# Patient Record
Sex: FEMALE | Race: OTHER | NOT HISPANIC OR LATINO | URBAN - METROPOLITAN AREA
[De-identification: names, ages, dates, MRNs, and addresses within clinical notes are randomized per-mention and may not be internally consistent; named-entity substitution may affect disease eponyms.]

---

## 2023-05-20 ENCOUNTER — EMERGENCY (EMERGENCY)
Facility: HOSPITAL | Age: 33
LOS: 1 days | Discharge: HOME | End: 2023-05-20
Attending: STUDENT IN AN ORGANIZED HEALTH CARE EDUCATION/TRAINING PROGRAM | Admitting: EMERGENCY MEDICINE
Payer: MEDICAID

## 2023-05-20 VITALS
SYSTOLIC BLOOD PRESSURE: 126 MMHG | DIASTOLIC BLOOD PRESSURE: 83 MMHG | TEMPERATURE: 98 F | HEART RATE: 111 BPM | OXYGEN SATURATION: 100 % | RESPIRATION RATE: 18 BRPM

## 2023-05-20 LAB
ALBUMIN SERPL ELPH-MCNC: 3.6 G/DL — SIGNIFICANT CHANGE UP (ref 3.3–5)
ALP SERPL-CCNC: 180 U/L — HIGH (ref 40–120)
ALT FLD-CCNC: 12 U/L — SIGNIFICANT CHANGE UP (ref 4–33)
ANION GAP SERPL CALC-SCNC: 14 MMOL/L — SIGNIFICANT CHANGE UP (ref 7–14)
AST SERPL-CCNC: 21 U/L — SIGNIFICANT CHANGE UP (ref 4–32)
B PERT DNA SPEC QL NAA+PROBE: SIGNIFICANT CHANGE UP
B PERT+PARAPERT DNA PNL SPEC NAA+PROBE: SIGNIFICANT CHANGE UP
BASE EXCESS BLDV CALC-SCNC: -5 MMOL/L — LOW (ref -2–3)
BASOPHILS # BLD AUTO: 0.05 K/UL — SIGNIFICANT CHANGE UP (ref 0–0.2)
BASOPHILS NFR BLD AUTO: 0.4 % — SIGNIFICANT CHANGE UP (ref 0–2)
BILIRUB SERPL-MCNC: <0.2 MG/DL — SIGNIFICANT CHANGE UP (ref 0.2–1.2)
BLOOD GAS VENOUS COMPREHENSIVE RESULT: SIGNIFICANT CHANGE UP
BORDETELLA PARAPERTUSSIS (RAPRVP): SIGNIFICANT CHANGE UP
BUN SERPL-MCNC: 6 MG/DL — LOW (ref 7–23)
C PNEUM DNA SPEC QL NAA+PROBE: SIGNIFICANT CHANGE UP
CALCIUM SERPL-MCNC: 9 MG/DL — SIGNIFICANT CHANGE UP (ref 8.4–10.5)
CHLORIDE BLDV-SCNC: 101 MMOL/L — SIGNIFICANT CHANGE UP (ref 96–108)
CHLORIDE SERPL-SCNC: 101 MMOL/L — SIGNIFICANT CHANGE UP (ref 98–107)
CO2 BLDV-SCNC: 24.5 MMOL/L — SIGNIFICANT CHANGE UP (ref 22–26)
CO2 SERPL-SCNC: 19 MMOL/L — LOW (ref 22–31)
CREAT SERPL-MCNC: 0.43 MG/DL — LOW (ref 0.5–1.3)
EGFR: 133 ML/MIN/1.73M2 — SIGNIFICANT CHANGE UP
EOSINOPHIL # BLD AUTO: 0.34 K/UL — SIGNIFICANT CHANGE UP (ref 0–0.5)
EOSINOPHIL NFR BLD AUTO: 2.9 % — SIGNIFICANT CHANGE UP (ref 0–6)
FLUAV SUBTYP SPEC NAA+PROBE: SIGNIFICANT CHANGE UP
FLUBV RNA SPEC QL NAA+PROBE: SIGNIFICANT CHANGE UP
GAS PNL BLDV: 130 MMOL/L — LOW (ref 136–145)
GAS PNL BLDV: SIGNIFICANT CHANGE UP
GLUCOSE BLDV-MCNC: 125 MG/DL — HIGH (ref 70–99)
GLUCOSE SERPL-MCNC: 128 MG/DL — HIGH (ref 70–99)
HADV DNA SPEC QL NAA+PROBE: SIGNIFICANT CHANGE UP
HCO3 BLDV-SCNC: 23 MMOL/L — SIGNIFICANT CHANGE UP (ref 22–29)
HCOV 229E RNA SPEC QL NAA+PROBE: SIGNIFICANT CHANGE UP
HCOV HKU1 RNA SPEC QL NAA+PROBE: SIGNIFICANT CHANGE UP
HCOV NL63 RNA SPEC QL NAA+PROBE: SIGNIFICANT CHANGE UP
HCOV OC43 RNA SPEC QL NAA+PROBE: SIGNIFICANT CHANGE UP
HCT VFR BLD CALC: 30.5 % — LOW (ref 34.5–45)
HCT VFR BLDA CALC: 57 % — CRITICAL HIGH (ref 34.5–46.5)
HGB BLD CALC-MCNC: 19 G/DL — CRITICAL HIGH (ref 11.7–16.1)
HGB BLD-MCNC: 10.1 G/DL — LOW (ref 11.5–15.5)
HMPV RNA SPEC QL NAA+PROBE: SIGNIFICANT CHANGE UP
HPIV1 RNA SPEC QL NAA+PROBE: SIGNIFICANT CHANGE UP
HPIV2 RNA SPEC QL NAA+PROBE: SIGNIFICANT CHANGE UP
HPIV3 RNA SPEC QL NAA+PROBE: SIGNIFICANT CHANGE UP
HPIV4 RNA SPEC QL NAA+PROBE: SIGNIFICANT CHANGE UP
IANC: 7.53 K/UL — HIGH (ref 1.8–7.4)
IMM GRANULOCYTES NFR BLD AUTO: 0.6 % — SIGNIFICANT CHANGE UP (ref 0–0.9)
LACTATE BLDV-MCNC: 2.6 MMOL/L — HIGH (ref 0.5–2)
LDH SERPL L TO P-CCNC: 176 U/L — SIGNIFICANT CHANGE UP (ref 135–225)
LYMPHOCYTES # BLD AUTO: 2.68 K/UL — SIGNIFICANT CHANGE UP (ref 1–3.3)
LYMPHOCYTES # BLD AUTO: 22.6 % — SIGNIFICANT CHANGE UP (ref 13–44)
M PNEUMO DNA SPEC QL NAA+PROBE: SIGNIFICANT CHANGE UP
MAGNESIUM SERPL-MCNC: 1.9 MG/DL — SIGNIFICANT CHANGE UP (ref 1.6–2.6)
MCHC RBC-ENTMCNC: 29.6 PG — SIGNIFICANT CHANGE UP (ref 27–34)
MCHC RBC-ENTMCNC: 33.1 GM/DL — SIGNIFICANT CHANGE UP (ref 32–36)
MCV RBC AUTO: 89.4 FL — SIGNIFICANT CHANGE UP (ref 80–100)
MONOCYTES # BLD AUTO: 1.19 K/UL — HIGH (ref 0–0.9)
MONOCYTES NFR BLD AUTO: 10 % — SIGNIFICANT CHANGE UP (ref 2–14)
NEUTROPHILS # BLD AUTO: 7.53 K/UL — HIGH (ref 1.8–7.4)
NEUTROPHILS NFR BLD AUTO: 63.5 % — SIGNIFICANT CHANGE UP (ref 43–77)
NRBC # BLD: 0 /100 WBCS — SIGNIFICANT CHANGE UP (ref 0–0)
NRBC # FLD: 0 K/UL — SIGNIFICANT CHANGE UP (ref 0–0)
NT-PROBNP SERPL-SCNC: <5 PG/ML — SIGNIFICANT CHANGE UP
PCO2 BLDV: 51 MMHG — SIGNIFICANT CHANGE UP (ref 39–52)
PH BLDV: 7.26 — LOW (ref 7.32–7.43)
PLATELET # BLD AUTO: 489 K/UL — HIGH (ref 150–400)
PO2 BLDV: 27 MMHG — SIGNIFICANT CHANGE UP (ref 25–45)
POTASSIUM BLDV-SCNC: 3.7 MMOL/L — SIGNIFICANT CHANGE UP (ref 3.5–5.1)
POTASSIUM SERPL-MCNC: 3.8 MMOL/L — SIGNIFICANT CHANGE UP (ref 3.5–5.3)
POTASSIUM SERPL-SCNC: 3.8 MMOL/L — SIGNIFICANT CHANGE UP (ref 3.5–5.3)
PROT SERPL-MCNC: 7.5 G/DL — SIGNIFICANT CHANGE UP (ref 6–8.3)
RAPID RVP RESULT: DETECTED
RBC # BLD: 3.41 M/UL — LOW (ref 3.8–5.2)
RBC # FLD: 13.3 % — SIGNIFICANT CHANGE UP (ref 10.3–14.5)
RSV RNA SPEC QL NAA+PROBE: SIGNIFICANT CHANGE UP
RV+EV RNA SPEC QL NAA+PROBE: SIGNIFICANT CHANGE UP
SAO2 % BLDV: 30.3 % — LOW (ref 67–88)
SARS-COV-2 RNA SPEC QL NAA+PROBE: DETECTED
SODIUM SERPL-SCNC: 134 MMOL/L — LOW (ref 135–145)
TROPONIN T, HIGH SENSITIVITY RESULT: <6 NG/L — SIGNIFICANT CHANGE UP
URATE SERPL-MCNC: 3.4 MG/DL — SIGNIFICANT CHANGE UP (ref 2.5–7)
WBC # BLD: 11.86 K/UL — HIGH (ref 3.8–10.5)
WBC # FLD AUTO: 11.86 K/UL — HIGH (ref 3.8–10.5)

## 2023-05-20 PROCEDURE — 93010 ELECTROCARDIOGRAM REPORT: CPT | Mod: 76

## 2023-05-20 PROCEDURE — 99284 EMERGENCY DEPT VISIT MOD MDM: CPT

## 2023-05-20 RX ORDER — ACETAMINOPHEN 500 MG
1000 TABLET ORAL ONCE
Refills: 0 | Status: COMPLETED | OUTPATIENT
Start: 2023-05-20 | End: 2023-05-21

## 2023-05-20 NOTE — PROVIDER CONTACT NOTE (OTHER) - BACKGROUND
Brought to ED after 4 days of chest pain post 23hr flight from [facility].   Pt reports chest pain SOB and coughing. Pt denies fatigue, lightheadedness, fever, chills, & VB. Pt reports +FM.

## 2023-05-20 NOTE — CHART NOTE - NSCHARTNOTEFT_GEN_A_CORE
GABRIEL ALEC  33y  Female [id number]    HPI:  34yo  at 34+1 by reported CHAYO of 23 presenting to [facility] ED with 2 day hx of SOB and cough. Denies fevers or chills. Denies nausea/vomiting diarrhea. Pt has reports pain in her back and RUQ. Denies HA or blurry vision. Denies abdominal pain. Denies ctx, lof, vb. +FM. [place] [facility] 4 days ago. Reports PNC uncomplicated to this point, denies any issues.     Prior ObHx - FT CS (no known indication ), unsure of weight. Reports uncomplicated pregnancy.   MedHx - Hypothyroid  Denies additional surgical hx. Denies allergies.       Vital Signs Last 24 Hrs  T(C): 36.8 (20 May 2023 18:49), Max: 36.8 (20 May 2023 18:49)  T(F): 98.3 (20 May 2023 18:49), Max: 98.3 (20 May 2023 18:49)  HR: 111 (20 May 2023 18:49) (111 - 111)  BP: 126/83 (20 May 2023 18:49) (126/83 - 126/83)  BP(mean): --  RR: 18 (20 May 2023 18:49) (18 - 18)  SpO2: 100% (20 May 2023 18:49) (100% - 100%)    Parameters below as of 20 May 2023 18:49  Patient On (Oxygen Delivery Method): room air    Physical Exam:   General: sitting comftorably in bed, coughing   Back: No CVA tenderness  Abd: Soft, non-tender, gravid. No palpable RUQ pain. .    Ext: non-tender b/l, no edema       Bedside Sonogram - Vertex, right lateral placenta, 8/8 BPP      37yo P1 at 34+1 presenting w 2 day hx SOB/cough, after arriving from [facility] 4 days ago. No acute signs of PTL. Normotensive on arrival, PEC/SPEC remains on differential.   - Pt to remain in ED for continued evaluation  - BP monitoring q15 mins. PI labs sents  - L+D RN to obtain NST, BPP reassuring  - Pt to be ruled out for PE by ED team     Pt seen and evaluated w Dr Lei Fuentes PGY4 GABRIEL ALEC  33y  Female [id number]    HPI:  32yo  at 34+1 by reported CHAYO of 23 presenting to [facility] ED with 2 day hx of SOB and cough. Denies fevers or chills. Denies nausea/vomiting diarrhea. Pt has reports pain in her back and RUQ. Denies HA or blurry vision. Denies abdominal pain. Denies ctx, lof, vb. +FM. [place] [facility] 4 days ago. Reports PNC uncomplicated to this point, denies any issues.     Prior ObHx - FT CS (no known indication ), unsure of weight. Reports uncomplicated pregnancy.   MedHx - Hypothyroid  Denies additional surgical hx. Denies allergies.       Vital Signs Last 24 Hrs  T(C): 36.8 (20 May 2023 18:49), Max: 36.8 (20 May 2023 18:49)  T(F): 98.3 (20 May 2023 18:49), Max: 98.3 (20 May 2023 18:49)  HR: 111 (20 May 2023 18:49) (111 - 111)  BP: 126/83 (20 May 2023 18:49) (126/83 - 126/83)  BP(mean): --  RR: 18 (20 May 2023 18:49) (18 - 18)  SpO2: 100% (20 May 2023 18:49) (100% - 100%)    Parameters below as of 20 May 2023 18:49  Patient On (Oxygen Delivery Method): room air    Physical Exam:   General: sitting comftorably in bed, coughing   Back: No CVA tenderness  Abd: Soft, non-tender, gravid. No palpable RUQ pain. .    Ext: non-tender b/l, no edema       Bedside Sonogram - Vertex, right lateral placenta, 8/8 BPP      35yo P1 at 34+1 presenting w 2 day hx SOB/cough, after arriving from [facility] 4 days ago. No acute signs of PTL. Normotensive on arrival, PEC/SPEC remains on differential.   - Pt to remain in ED for continued evaluation  - BP monitoring q15 mins. PI labs sents  - L+D RN to obtain NST, BPP reassuring  - Pt to be ruled out for PE by ED team     Pt seen and evaluated w Dr Lei Fuentes PGY4        ObGyn Service Attending Addendum    Patient evaluated at bedside with PGY4 Alfredo for  huddle. Patient is a 32yo P1 with h/o prior c/s for unknown indication now at 34w1d by CHAYO 23 per patient who presents for chest pain, abdominal pain, back pain, and cough for 2 days in the setting of recent travel from [facility] 4 days ago. Denies headache, blurry vision, scotoma, chest pain, shortness of breath, fevers/chills, sick contacts, nausea/vomiting, or diarrhea. Also denies contractions, vaginal bleeding, or leakage of fluid. Endorses fetal movement. Prenatal care in [facility] with records at bedside though scant information obtained. PMH significant for hypothyroidism on Synthroid 50mcg. Vitals reviewed: /83, , afebrile, SpO2 100% RA. Patient overall well appearing though uncomfortable at times, coughing intermittently. Abdomen soft, gravid, no contractions palpated, non-tender, no rebound/guarding. No calf tenderness. TAUS bedside performed with BPP 8/8, reassuring fetal status. D/w ED Attending at bedside recommendation for preeclampsia labs with CBC, CMP, UrA, LDH, and protein P/C to r/o preeclampsia. Will also obtain CTA to r/o PE and RVP given cough and recent travel. Patient to stay in ED for further evaluation and will have L&D RN bring NST machine to ED room to obtain NST. Plan d/w patient and  at bedside. All questions answered.    Lei MORRISON

## 2023-05-20 NOTE — ED ADULT NURSE NOTE - CHIEF COMPLAINT QUOTE
PT is 32 weeks gestation.  .  CHAYO .  PT c/o mid upper abd pain radiating into back, difficulty breathing and nonproductive cough x 2 days.  Denies vaginal bleeding, fevers, chills.  PT noted to be coughing frequently and taking slow deep breaths in triage.  PMHX- Hypothyroidism.  L and D JOSE Valdez made aware.  Charge RN aware, PT taken to room 19.   huddle called at this time.

## 2023-05-20 NOTE — ED PROVIDER NOTE - PATIENT PORTAL LINK FT
You can access the FollowMyHealth Patient Portal offered by [facility] by registering at the following website: [email or web address]. By joining [employer]’s FollowMyHealth portal, you will also be able to view your health information using other applications (apps) compatible with our system.

## 2023-05-20 NOTE — ED PROVIDER NOTE - NSFOLLOWUPINSTRUCTIONS_ED_ALL_ED_FT
You were seen in the Emergency Department for  shortness of breath. Lab and imaging results, if performed, were discussed with you along with your discharge diagnosis.      Follow-up with your obstetrician.  A list of providers has also been given.  Follow up with your doctor in 1 week - bring copies of your results if you were given. If you do not have a primary doctor, please call [phone] to find one convenient for you.    Continue all prescribed medications.     To control your pain at home, you should take Tylenol 650mg-1000mg every 6 to 8 hours. Limit your maximum daily Tylenol from all sources to 4000mg. Be aware that many other medications contain acetaminophen which is also known as Tylenol. Taking Tylenol and Ibuprofen together has been shown to be more effective at relieving pain than taking them separately. These are both over the counter medications that you can  at your local pharmacy without a prescription. You need to respect all of the warnings on the bottles. You shouldn’t take these medications for more than a week without following up with your doctor. Both medications come with certain risks and side effects that you need to discuss with your doctor, especially if you are taking them for a prolonged period.    Return to ED for any new or worsening symptoms including but not limited to: development of chest pain, shortness of breath, fever, vomiting, focal numbness, weakness or tingling, any severe CP, headache, abdominal pain, back pain.      Rest and keep yourself hydrated with fluids

## 2023-05-20 NOTE — ED ADULT TRIAGE NOTE - ACCOMPANIED BY
Caller: BECKI GREENBERG    Relationship: Mother    Best call back number: [phone]    What form or medical record are you requesting:     SCHOOL NOTE FROM  2.6.23 THROUGH 2.8.23    Who is requesting this form or medical record from you: [facility] [employer] [facility]    How would you like to receive the form or medical records (pick-up, mail, fax): FAX  If fax, what is the fax number: [phone]      Timeframe paperwork needed: AS SOON AS POSSIBLE    Additional notes:     PATIENT WAS SEEN ON 2.6.23 BUT SHE STILL HAS A FEVER AND NEEDS A NEW NOTE.           
New note has been typed and signed   
Immediate family member

## 2023-05-20 NOTE — ED PROVIDER NOTE - NSFOLLOWUPCLINICS_GEN_ALL_ED_FT
Ambulatory [facility] Unit - [facility] OBGYN  OBGYN  [address] [address]  Phone: [phone]  Fax: [phone]    [facility]  OB/GYN & Surg  [address] [place] [address]  Phone: [phone]  Fax:     [facility] Gynecology and Obstetrics  Gynceology/OB  [address] [place] [address]  Phone: [phone]  Fax:     [facility]  OBGYN  [address] [place] [address]  Phone: [phone]  Fax: [phone]

## 2023-05-20 NOTE — ED PROVIDER NOTE - OBJECTIVE STATEMENT
33-year-old female G2, P1, 34 weeks pregnant, history of hyperthyroidism presents with 2 days of shortness of breath, worsening.  Patient landed from below the age 4 days ago.  No chest pain.  No contractions at this time.  OB at bedside.  Prenatal huddle called upon patient's arrival..  No fevers, chills, nausea, vomiting, vaginal bleeding.

## 2023-05-20 NOTE — PROVIDER CONTACT NOTE (OTHER) - ASSESSMENT
NST performed by RN and reviewed with MD Odom. FHR baseline 135. mod variability. + acc cells. - decels.

## 2023-05-20 NOTE — ED ADULT NURSE NOTE - OBJECTIVE STATEMENT
Patient is A&OX4, awake and alert. Pt coming to ED from home regarding ABD pain and non productive cough. Pt [place] the [place] four [place] ago from [place], pt states two days ago she started to have ABD pain, in the ULQ and URQ, radiating to lower back, Pt has a non productive cough that started two days ago. Pt denies any fever, chills, chest pain, vaginal bleeding. , CHAYO . PMH hypothyroidism. lung sounds clear BL, respirations are even and labored. heart tones audible and regular.

## 2023-05-20 NOTE — ED PROVIDER NOTE - PROGRESS NOTE DETAILS
Shahrzad Woods MD (PGY2): CTA negative for PE.   COVID-positive.   Touched base with OB resident, if patient saturating well  okay for discharge from their standpoint.  On my initial assessment patient with tired appearing and did not want to stand up.  Give fluids and reassess.  On reassessment patient feels much better.  Ambulatory sat never went below 97%.  Using shared decision making patient and  would like to go home and strict return precautions discussed in detail.

## 2023-05-20 NOTE — ED ADULT NURSE REASSESSMENT NOTE - NSFALLUNIVINTERV_ED_ALL_ED
Bed/Stretcher in lowest position, wheels locked, appropriate side rails in place/Call bell, personal items and telephone in reach/Instruct patient to call for assistance before getting out of bed/chair/stretcher/Non-slip footwear applied when patient is off stretcher/[place] to call system/Physically safe environment - no spills, clutter or unnecessary equipment/Purposeful proactive rounding/Room/bathroom lighting operational, light cord in reach

## 2023-05-20 NOTE — ED PROVIDER NOTE - PHYSICAL EXAMINATION
Patient appears mildly tachypneic.  No respiratory distress.  Abdomen gravid.  Clear to auscultation bilaterally.  Tachycardic.  Regular.  No calf swelling or pain noted.

## 2023-05-20 NOTE — ED ADULT NURSE NOTE - NSFALLUNIVINTERV_ED_ALL_ED
Bed/Stretcher in lowest position, wheels locked, appropriate side rails in place/Call bell, personal items and telephone in reach/Instruct patient to call for assistance before getting out of bed/chair/stretcher/Non-slip footwear applied when patient is off stretcher/[place] to call system/Physically safe environment - no spills, clutter or unnecessary equipment/Purposeful proactive rounding/Room/bathroom lighting operational, light cord in reach

## 2023-05-20 NOTE — ED PROVIDER NOTE - NSFOLLOWUPCLINICSTOKEN_GEN_ALL_ED_FT
[id number]: || ||00\01||False;[id number]: || ||00\01||False;[id number]: || ||00\01||False;[id number]: || ||00\01||False;

## 2023-05-20 NOTE — ED ADULT TRIAGE NOTE - CHIEF COMPLAINT QUOTE
PT having upper abd pain radiating to back and difficulty breathing and nonproductive cough x 2 days.  PT is 32 weeks gestation.  .  CHAYO .  Denies vaginal bleeding, fevers, chills.  PT noted to be cough frequently and taking deep breaths in triage.  PMHX- Hypothyroidism.  L and D JOSE Valdez made aware, no  huddle needed at this time until decided by ED attending.  Charge RN aware.  PT taken back to room for eval. PT having upper abd pain radiating to back and difficulty breathing and nonproductive cough x 2 days.  PT is 32 weeks gestation.  .  CHAYO .  Denies vaginal bleeding, fevers, chills.  PT noted to be cough frequently and taking deep breaths in triage.  PMHX- Hypothyroidism.  L and D JOSE Valdez made aware, no  huddle needed at this time until decided by ED attending.  Charge RN aware.  PT taken back to room for eval.    addendum-  huddle called at this time. PT having upper abd pain radiating to back and difficulty breathing and nonproductive cough x 2 days.  PT is 32 weeks gestation.  .  CHAYO .  Denies vaginal bleeding, fevers, chills.  PT noted to be cough frequently and taking deep breaths in triage.  PMHX- Hypothyroidism.  L and D JOSE Valdez made aware.  Charge RN aware, PT to be taken to rm 19.   huddle called at this time. PT is 32 weeks gestation.  .  CHAYO .  PT c/o mid upper abd pain radiating into back, difficulty breathing and nonproductive cough x 2 days.  Denies vaginal bleeding, fevers, chills.  PT noted to be coughing frequently and taking slow deep breaths in triage.  PMHX- Hypothyroidism.  L and D JOSE Valdez made aware.  Charge RN aware, PT taken to room 19.   huddle called at this time.

## 2023-05-20 NOTE — ED PROVIDER NOTE - ATTENDING CONTRIBUTION TO CARE
33-year-old female  34 weeks pregnant history of hypothyroidism coming in with 2 days of shortness of breath, worsening.  Given recent travel, tachycardia, pregnancy very high concern for PE.If CT is negative plan to go to L&D.

## 2023-05-20 NOTE — ED ADULT NURSE REASSESSMENT NOTE - NS ED NURSE REASSESS COMMENT FT1
Pt received from previous shift on stretcher A/Ox3 answer all questions approrpriately  Pt denies chest pain or sob at time of reassessment  Breathing is even and unlabored on room air  Abdomen is soft and non distended  Normally ambulates independently w/ steady gait, moves all four extremities on command  Not in any apparent distress at time of reassessment  Vitals are stable  Pending urine labs and EKG

## 2023-05-21 VITALS
SYSTOLIC BLOOD PRESSURE: 105 MMHG | DIASTOLIC BLOOD PRESSURE: 73 MMHG | HEART RATE: 101 BPM | OXYGEN SATURATION: 100 % | RESPIRATION RATE: 17 BRPM | TEMPERATURE: 98 F

## 2023-05-21 LAB
APPEARANCE UR: CLEAR — SIGNIFICANT CHANGE UP
APTT BLD: 28.4 SEC — SIGNIFICANT CHANGE UP (ref 27–36.3)
BASE EXCESS BLDV CALC-SCNC: -5.8 MMOL/L — LOW (ref -2–3)
BILIRUB UR-MCNC: NEGATIVE — SIGNIFICANT CHANGE UP
BLOOD GAS VENOUS COMPREHENSIVE RESULT: SIGNIFICANT CHANGE UP
CHLORIDE BLDV-SCNC: 104 MMOL/L — SIGNIFICANT CHANGE UP (ref 96–108)
CO2 BLDV-SCNC: 20.5 MMOL/L — LOW (ref 22–26)
COLOR SPEC: SIGNIFICANT CHANGE UP
DIFF PNL FLD: NEGATIVE — SIGNIFICANT CHANGE UP
GAS PNL BLDV: 131 MMOL/L — LOW (ref 136–145)
GAS PNL BLDV: SIGNIFICANT CHANGE UP
GLUCOSE BLDV-MCNC: 110 MG/DL — HIGH (ref 70–99)
GLUCOSE UR QL: NEGATIVE — SIGNIFICANT CHANGE UP
HCO3 BLDV-SCNC: 19 MMOL/L — LOW (ref 22–29)
HCT VFR BLDA CALC: 30 % — LOW (ref 34.5–46.5)
HGB BLD CALC-MCNC: 10 G/DL — LOW (ref 11.7–16.1)
INR BLD: 1.02 RATIO — SIGNIFICANT CHANGE UP (ref 0.88–1.16)
KETONES UR-MCNC: NEGATIVE — SIGNIFICANT CHANGE UP
LACTATE BLDV-MCNC: 1.8 MMOL/L — SIGNIFICANT CHANGE UP (ref 0.5–2)
LEUKOCYTE ESTERASE UR-ACNC: NEGATIVE — SIGNIFICANT CHANGE UP
NITRITE UR-MCNC: NEGATIVE — SIGNIFICANT CHANGE UP
PCO2 BLDV: 36 MMHG — LOW (ref 39–52)
PH BLDV: 7.34 — SIGNIFICANT CHANGE UP (ref 7.32–7.43)
PH UR: 7 — SIGNIFICANT CHANGE UP (ref 5–8)
PO2 BLDV: 52 MMHG — HIGH (ref 25–45)
POTASSIUM BLDV-SCNC: 4.1 MMOL/L — SIGNIFICANT CHANGE UP (ref 3.5–5.1)
PROT UR-MCNC: NEGATIVE — SIGNIFICANT CHANGE UP
PROTHROM AB SERPL-ACNC: 11.8 SEC — SIGNIFICANT CHANGE UP (ref 10.5–13.4)
SAO2 % BLDV: 84.3 % — SIGNIFICANT CHANGE UP (ref 67–88)
SP GR SPEC: 1.01 — SIGNIFICANT CHANGE UP (ref 1.01–1.05)
UROBILINOGEN FLD QL: SIGNIFICANT CHANGE UP

## 2023-05-21 PROCEDURE — 71275 CT ANGIOGRAPHY CHEST: CPT | Mod: 26,MA

## 2023-05-21 RX ORDER — SODIUM CHLORIDE 9 MG/ML
1000 INJECTION INTRAMUSCULAR; INTRAVENOUS; SUBCUTANEOUS ONCE
Refills: 0 | Status: COMPLETED | OUTPATIENT
Start: 2023-05-21 | End: 2023-05-21

## 2023-05-21 RX ADMIN — SODIUM CHLORIDE 1000 MILLILITER(S): 9 INJECTION INTRAMUSCULAR; INTRAVENOUS; SUBCUTANEOUS at 02:39

## 2023-05-21 RX ADMIN — Medication 400 MILLIGRAM(S): at 01:18

## 2023-06-06 ENCOUNTER — APPOINTMENT (OUTPATIENT)
Dept: OBGYN | Facility: CLINIC | Age: 33
End: 2023-06-06
Payer: MEDICAID

## 2023-06-06 ENCOUNTER — OUTPATIENT (OUTPATIENT)
Dept: OUTPATIENT SERVICES | Facility: HOSPITAL | Age: 33
LOS: 1 days | End: 2023-06-06
Payer: MEDICAID

## 2023-06-06 VITALS
WEIGHT: 150 LBS | HEART RATE: 97 BPM | DIASTOLIC BLOOD PRESSURE: 79 MMHG | OXYGEN SATURATION: 100 % | RESPIRATION RATE: 18 BRPM | TEMPERATURE: 98.2 F | HEIGHT: 61 IN | SYSTOLIC BLOOD PRESSURE: 116 MMHG | BODY MASS INDEX: 28.32 KG/M2

## 2023-06-06 DIAGNOSIS — Z78.9 OTHER SPECIFIED HEALTH STATUS: ICD-10-CM

## 2023-06-06 DIAGNOSIS — Z34.00 ENCOUNTER FOR SUPERVISION OF NORMAL FIRST PREGNANCY, UNSPECIFIED TRIMESTER: ICD-10-CM

## 2023-06-06 PROBLEM — Z00.00 ENCOUNTER FOR PREVENTIVE HEALTH EXAMINATION: Status: ACTIVE | Noted: 2023-06-06

## 2023-06-06 PROCEDURE — 86787 VARICELLA-ZOSTER ANTIBODY: CPT

## 2023-06-06 PROCEDURE — 83655 ASSAY OF LEAD: CPT

## 2023-06-06 PROCEDURE — 87086 URINE CULTURE/COLONY COUNT: CPT

## 2023-06-06 PROCEDURE — 36415 COLL VENOUS BLD VENIPUNCTURE: CPT | Mod: NC

## 2023-06-06 PROCEDURE — 86762 RUBELLA ANTIBODY: CPT

## 2023-06-06 PROCEDURE — 87591 N.GONORRHOEAE DNA AMP PROB: CPT

## 2023-06-06 PROCEDURE — 84443 ASSAY THYROID STIM HORMONE: CPT

## 2023-06-06 PROCEDURE — 83020 HEMOGLOBIN ELECTROPHORESIS: CPT

## 2023-06-06 PROCEDURE — 82950 GLUCOSE TEST: CPT

## 2023-06-06 PROCEDURE — 85025 COMPLETE CBC W/AUTO DIFF WBC: CPT

## 2023-06-06 PROCEDURE — 84550 ASSAY OF BLOOD/URIC ACID: CPT

## 2023-06-06 PROCEDURE — 87340 HEPATITIS B SURFACE AG IA: CPT

## 2023-06-06 PROCEDURE — 86901 BLOOD TYPING SEROLOGIC RH(D): CPT

## 2023-06-06 PROCEDURE — 81220 CFTR GENE COM VARIANTS: CPT

## 2023-06-06 PROCEDURE — G0463: CPT

## 2023-06-06 PROCEDURE — 86803 HEPATITIS C AB TEST: CPT

## 2023-06-06 PROCEDURE — 99204 OFFICE O/P NEW MOD 45 MIN: CPT

## 2023-06-06 PROCEDURE — 87491 CHLMYD TRACH DNA AMP PROBE: CPT

## 2023-06-06 PROCEDURE — 87653 STREP B DNA AMP PROBE: CPT

## 2023-06-06 PROCEDURE — 84156 ASSAY OF PROTEIN URINE: CPT

## 2023-06-06 PROCEDURE — 80053 COMPREHEN METABOLIC PANEL: CPT

## 2023-06-06 PROCEDURE — 86480 TB TEST CELL IMMUN MEASURE: CPT

## 2023-06-06 PROCEDURE — 81243 FMR1 GEN ALY DETC ABNL ALLEL: CPT

## 2023-06-06 PROCEDURE — 87389 HIV-1 AG W/HIV-1&-2 AB AG IA: CPT

## 2023-06-06 PROCEDURE — 36415 COLL VENOUS BLD VENIPUNCTURE: CPT

## 2023-06-06 PROCEDURE — 81003 URINALYSIS AUTO W/O SCOPE: CPT

## 2023-06-06 PROCEDURE — 87624 HPV HI-RISK TYP POOLED RSLT: CPT

## 2023-06-06 PROCEDURE — 81025 URINE PREGNANCY TEST: CPT

## 2023-06-06 PROCEDURE — 86850 RBC ANTIBODY SCREEN: CPT

## 2023-06-06 PROCEDURE — 83036 HEMOGLOBIN GLYCOSYLATED A1C: CPT

## 2023-06-06 PROCEDURE — 86900 BLOOD TYPING SEROLOGIC ABO: CPT

## 2023-06-06 PROCEDURE — 81420 FETAL CHRMOML ANEUPLOIDY: CPT

## 2023-06-06 PROCEDURE — 84439 ASSAY OF FREE THYROXINE: CPT

## 2023-06-06 PROCEDURE — 86780 TREPONEMA PALLIDUM: CPT

## 2023-06-06 PROCEDURE — 86765 RUBEOLA ANTIBODY: CPT

## 2023-06-06 RX ORDER — PRENATAL VIT 108/IRON,CB/FOLIC 30 MG-1 MG
30-1 TABLET ORAL
Qty: 30 | Refills: 11 | Status: ACTIVE | COMMUNITY
Start: 2023-06-06 | End: 1900-01-01

## 2023-06-06 RX ORDER — LEVOTHYROXINE SODIUM 50 UG/1
50 TABLET ORAL
Refills: 0 | Status: ACTIVE | COMMUNITY

## 2023-06-07 ENCOUNTER — NON-APPOINTMENT (OUTPATIENT)
Age: 33
End: 2023-06-07

## 2023-06-07 ENCOUNTER — APPOINTMENT (OUTPATIENT)
Dept: OBGYN | Facility: CLINIC | Age: 33
End: 2023-06-07

## 2023-06-07 DIAGNOSIS — Z11.3 ENCOUNTER FOR SCREENING FOR INFECTIONS WITH A PREDOMINANTLY SEXUAL MODE OF TRANSMISSION: ICD-10-CM

## 2023-06-07 DIAGNOSIS — E03.9 HYPOTHYROIDISM, UNSPECIFIED: ICD-10-CM

## 2023-06-07 DIAGNOSIS — O09.33 SUPERVISION OF PREGNANCY WITH INSUFFICIENT ANTENATAL CARE, THIRD TRIMESTER: ICD-10-CM

## 2023-06-07 DIAGNOSIS — Z98.891 HISTORY OF UTERINE SCAR FROM PREVIOUS SURGERY: ICD-10-CM

## 2023-06-07 DIAGNOSIS — Z12.4 ENCOUNTER FOR SCREENING FOR MALIGNANT NEOPLASM OF CERVIX: ICD-10-CM

## 2023-06-07 DIAGNOSIS — Z12.39 ENCOUNTER FOR OTHER SCREENING FOR MALIGNANT NEOPLASM OF BREAST: ICD-10-CM

## 2023-06-07 DIAGNOSIS — Z3A.36 36 WEEKS GESTATION OF PREGNANCY: ICD-10-CM

## 2023-06-07 LAB
ABO + RH PNL BLD: NORMAL
ALBUMIN SERPL ELPH-MCNC: 3.7 G/DL
ALP BLD-CCNC: 230 U/L
ALT SERPL-CCNC: 16 U/L
ANION GAP SERPL CALC-SCNC: 15 MMOL/L
AST SERPL-CCNC: 28 U/L
BILIRUB SERPL-MCNC: 0.2 MG/DL
BLD GP AB SCN SERPL QL: NORMAL
BUN SERPL-MCNC: 9 MG/DL
C TRACH RRNA SPEC QL NAA+PROBE: NOT DETECTED
CALCIUM SERPL-MCNC: 9.1 MG/DL
CHLORIDE SERPL-SCNC: 101 MMOL/L
CO2 SERPL-SCNC: 18 MMOL/L
CREAT SERPL-MCNC: 0.45 MG/DL
CREAT SPEC-SCNC: 113 MG/DL
CREAT/PROT UR: 0.2 RATIO
EGFR: 132 ML/MIN/1.73M2
ESTIMATED AVERAGE GLUCOSE: 120 MG/DL
GLUCOSE 1H P 50 G GLC PO SERPL-MCNC: 123 MG/DL
GLUCOSE SERPL-MCNC: 124 MG/DL
HBA1C MFR BLD HPLC: 5.8 %
HBV SURFACE AG SER QL: NONREACTIVE
HCV AB SER QL: NONREACTIVE
HCV S/CO RATIO: 0.61 S/CO
HGB A MFR BLD: 97.4 %
HGB A2 MFR BLD: 2.6 %
HGB FRACT BLD-IMP: NORMAL
HIV1+2 AB SPEC QL IA.RAPID: NONREACTIVE
HPV HIGH+LOW RISK DNA PNL CVX: NOT DETECTED
N GONORRHOEA RRNA SPEC QL NAA+PROBE: NOT DETECTED
POTASSIUM SERPL-SCNC: 4.1 MMOL/L
PROT SERPL-MCNC: 7.2 G/DL
PROT UR-MCNC: 19 MG/DL
SODIUM SERPL-SCNC: 134 MMOL/L
SOURCE TP AMPLIFICATION: NORMAL
T PALLIDUM AB SER QL IA: NEGATIVE
T4 FREE SERPL-MCNC: 0.8 NG/DL
TSH SERPL-ACNC: 8.07 UIU/ML
URATE SERPL-MCNC: 5.3 MG/DL

## 2023-06-07 RX ORDER — FERROUS SULFATE 325(65) MG
325 (65 FE) TABLET ORAL TWICE DAILY
Qty: 60 | Refills: 9 | Status: ACTIVE | COMMUNITY
Start: 2023-06-07 | End: 1900-01-01

## 2023-06-09 LAB
BACTERIA UR CULT: NORMAL
GP B STREP DNA SPEC QL NAA+PROBE: NOT DETECTED
LEAD BLD-MCNC: 5 UG/DL
M TB IFN-G BLD-IMP: NEGATIVE
MEV IGG FLD QL IA: <5 AU/ML
MEV IGG+IGM SER-IMP: NEGATIVE
QUANTIFERON TB PLUS MITOGEN MINUS NIL: 6.93 IU/ML
QUANTIFERON TB PLUS NIL: 0.03 IU/ML
QUANTIFERON TB PLUS TB1 MINUS NIL: 0 IU/ML
QUANTIFERON TB PLUS TB2 MINUS NIL: -0.01 IU/ML
RUBV IGG FLD-ACNC: 8.1 INDEX
RUBV IGG SER-IMP: POSITIVE
SOURCE GBS: NORMAL
VZV AB TITR SER: POSITIVE
VZV IGG SER IF-ACNC: 513.9 INDEX

## 2023-06-12 LAB
CHROMOSOME13 INTERPRETATION: NORMAL
CHROMOSOME13 TEST RESULT: NORMAL
CHROMOSOME18 INTERPRETATION: NORMAL
CHROMOSOME18 TEST RESULT: NORMAL
CHROMOSOME21 INTERPRETATION: NORMAL
CHROMOSOME21 TEST RESULT: NORMAL
CYTOLOGY CVX/VAG DOC THIN PREP: NORMAL
FETAL FRACTION: NORMAL
FMR1 GENE MUT ANL BLD/T: NORMAL
PERFORMANCE AND LIMITATIONS: NORMAL
SEX CHROMOSOME INTERPRETATION: NORMAL
SEX CHROMOSOME TEST RESULT: NORMAL
VERIFI PRENATAL TEST: NOT DETECTED

## 2023-06-13 ENCOUNTER — INPATIENT (INPATIENT)
Facility: HOSPITAL | Age: 33
LOS: 3 days | Discharge: HOME | End: 2023-06-17
Attending: OBSTETRICS & GYNECOLOGY | Admitting: OBSTETRICS & GYNECOLOGY
Payer: MEDICAID

## 2023-06-13 ENCOUNTER — NON-APPOINTMENT (OUTPATIENT)
Age: 33
End: 2023-06-13

## 2023-06-13 ENCOUNTER — TRANSCRIPTION ENCOUNTER (OUTPATIENT)
Age: 33
End: 2023-06-13

## 2023-06-13 ENCOUNTER — OUTPATIENT (OUTPATIENT)
Dept: OUTPATIENT SERVICES | Facility: HOSPITAL | Age: 33
LOS: 1 days | End: 2023-06-13
Payer: MEDICAID

## 2023-06-13 ENCOUNTER — APPOINTMENT (OUTPATIENT)
Dept: OBGYN | Facility: CLINIC | Age: 33
End: 2023-06-13
Payer: MEDICAID

## 2023-06-13 VITALS
OXYGEN SATURATION: 100 % | SYSTOLIC BLOOD PRESSURE: 114 MMHG | WEIGHT: 150 LBS | HEIGHT: 61 IN | TEMPERATURE: 97 F | BODY MASS INDEX: 28.32 KG/M2 | RESPIRATION RATE: 18 BRPM | DIASTOLIC BLOOD PRESSURE: 79 MMHG | HEART RATE: 98 BPM

## 2023-06-13 DIAGNOSIS — Z98.890 OTHER SPECIFIED POSTPROCEDURAL STATES: ICD-10-CM

## 2023-06-13 DIAGNOSIS — Z12.39 ENCOUNTER FOR OTHER SCREENING FOR MALIGNANT NEOPLASM OF BREAST: ICD-10-CM

## 2023-06-13 DIAGNOSIS — Z3A.00 WEEKS OF GESTATION OF PREGNANCY NOT SPECIFIED: ICD-10-CM

## 2023-06-13 DIAGNOSIS — Z11.3 ENCOUNTER FOR SCREENING FOR INFECTIONS WITH A PREDOMINANTLY SEXUAL MODE OF TRANSMISSION: ICD-10-CM

## 2023-06-13 DIAGNOSIS — Z3A.36 36 WEEKS GESTATION OF PREGNANCY: ICD-10-CM

## 2023-06-13 DIAGNOSIS — O26.899 OTHER SPECIFIED PREGNANCY RELATED CONDITIONS, UNSPECIFIED TRIMESTER: ICD-10-CM

## 2023-06-13 DIAGNOSIS — Z34.00 ENCOUNTER FOR SUPERVISION OF NORMAL FIRST PREGNANCY, UNSPECIFIED TRIMESTER: ICD-10-CM

## 2023-06-13 LAB — AR GENE MUT ANL BLD/T: NORMAL

## 2023-06-13 PROCEDURE — 81003 URINALYSIS AUTO W/O SCOPE: CPT

## 2023-06-13 PROCEDURE — 99214 OFFICE O/P EST MOD 30 MIN: CPT

## 2023-06-13 PROCEDURE — G0463: CPT

## 2023-06-13 RX ORDER — SODIUM CHLORIDE 9 MG/ML
1000 INJECTION, SOLUTION INTRAVENOUS ONCE
Refills: 0 | Status: COMPLETED | OUTPATIENT
Start: 2023-06-13 | End: 2023-06-13

## 2023-06-13 RX ADMIN — SODIUM CHLORIDE 1000 MILLILITER(S): 9 INJECTION, SOLUTION INTRAVENOUS at 22:20

## 2023-06-14 ENCOUNTER — TRANSCRIPTION ENCOUNTER (OUTPATIENT)
Age: 33
End: 2023-06-14

## 2023-06-14 VITALS — WEIGHT: 141.1 LBS | HEIGHT: 61 IN

## 2023-06-14 DIAGNOSIS — Z34.80 ENCOUNTER FOR SUPERVISION OF OTHER NORMAL PREGNANCY, UNSPECIFIED TRIMESTER: ICD-10-CM

## 2023-06-14 DIAGNOSIS — Z98.891 HISTORY OF UTERINE SCAR FROM PREVIOUS SURGERY: ICD-10-CM

## 2023-06-14 DIAGNOSIS — O99.013 ANEMIA COMPLICATING PREGNANCY, THIRD TRIMESTER: ICD-10-CM

## 2023-06-14 DIAGNOSIS — E03.9 HYPOTHYROIDISM, UNSPECIFIED: ICD-10-CM

## 2023-06-14 DIAGNOSIS — O09.33 SUPERVISION OF PREGNANCY WITH INSUFFICIENT ANTENATAL CARE, THIRD TRIMESTER: ICD-10-CM

## 2023-06-14 LAB
APTT BLD: 29.2 SEC — SIGNIFICANT CHANGE UP (ref 27.5–35.5)
BASOPHILS # BLD AUTO: 0.03 K/UL — SIGNIFICANT CHANGE UP (ref 0–0.2)
BASOPHILS # BLD AUTO: 0.03 K/UL — SIGNIFICANT CHANGE UP (ref 0–0.2)
BASOPHILS NFR BLD AUTO: 0.2 % — SIGNIFICANT CHANGE UP (ref 0–2)
BASOPHILS NFR BLD AUTO: 0.3 % — SIGNIFICANT CHANGE UP (ref 0–2)
EOSINOPHIL # BLD AUTO: 0.01 K/UL — SIGNIFICANT CHANGE UP (ref 0–0.5)
EOSINOPHIL # BLD AUTO: 0.37 K/UL — SIGNIFICANT CHANGE UP (ref 0–0.5)
EOSINOPHIL NFR BLD AUTO: 0.1 % — SIGNIFICANT CHANGE UP (ref 0–6)
EOSINOPHIL NFR BLD AUTO: 3.1 % — SIGNIFICANT CHANGE UP (ref 0–6)
HCT VFR BLD CALC: 28.5 % — LOW (ref 34.5–45)
HCT VFR BLD CALC: 32.6 % — LOW (ref 34.5–45)
HGB BLD-MCNC: 10.6 G/DL — LOW (ref 11.5–15.5)
HGB BLD-MCNC: 9.5 G/DL — LOW (ref 11.5–15.5)
IMM GRANULOCYTES NFR BLD AUTO: 0.6 % — SIGNIFICANT CHANGE UP (ref 0–0.9)
IMM GRANULOCYTES NFR BLD AUTO: 0.6 % — SIGNIFICANT CHANGE UP (ref 0–0.9)
INR BLD: 0.96 RATIO — SIGNIFICANT CHANGE UP (ref 0.88–1.16)
LYMPHOCYTES # BLD AUTO: 15.8 % — SIGNIFICANT CHANGE UP (ref 13–44)
LYMPHOCYTES # BLD AUTO: 2.12 K/UL — SIGNIFICANT CHANGE UP (ref 1–3.3)
LYMPHOCYTES # BLD AUTO: 2.42 K/UL — SIGNIFICANT CHANGE UP (ref 1–3.3)
LYMPHOCYTES # BLD AUTO: 20.6 % — SIGNIFICANT CHANGE UP (ref 13–44)
MCHC RBC-ENTMCNC: 29.4 PG — SIGNIFICANT CHANGE UP (ref 27–34)
MCHC RBC-ENTMCNC: 30.1 PG — SIGNIFICANT CHANGE UP (ref 27–34)
MCHC RBC-ENTMCNC: 32.5 GM/DL — SIGNIFICANT CHANGE UP (ref 32–36)
MCHC RBC-ENTMCNC: 33.3 GM/DL — SIGNIFICANT CHANGE UP (ref 32–36)
MCV RBC AUTO: 90.2 FL — SIGNIFICANT CHANGE UP (ref 80–100)
MCV RBC AUTO: 90.6 FL — SIGNIFICANT CHANGE UP (ref 80–100)
MONOCYTES # BLD AUTO: 1.12 K/UL — HIGH (ref 0–0.9)
MONOCYTES # BLD AUTO: 1.33 K/UL — HIGH (ref 0–0.9)
MONOCYTES NFR BLD AUTO: 9.5 % — SIGNIFICANT CHANGE UP (ref 2–14)
MONOCYTES NFR BLD AUTO: 9.9 % — SIGNIFICANT CHANGE UP (ref 2–14)
NEUTROPHILS # BLD AUTO: 7.76 K/UL — HIGH (ref 1.8–7.4)
NEUTROPHILS # BLD AUTO: 9.86 K/UL — HIGH (ref 1.8–7.4)
NEUTROPHILS NFR BLD AUTO: 65.9 % — SIGNIFICANT CHANGE UP (ref 43–77)
NEUTROPHILS NFR BLD AUTO: 73.4 % — SIGNIFICANT CHANGE UP (ref 43–77)
NRBC # BLD: 0 /100 WBCS — SIGNIFICANT CHANGE UP (ref 0–0)
NRBC # BLD: 0 /100 WBCS — SIGNIFICANT CHANGE UP (ref 0–0)
PLATELET # BLD AUTO: 196 K/UL — SIGNIFICANT CHANGE UP (ref 150–400)
PLATELET # BLD AUTO: 201 K/UL — SIGNIFICANT CHANGE UP (ref 150–400)
PROTHROM AB SERPL-ACNC: 11.4 SEC — SIGNIFICANT CHANGE UP (ref 10.5–13.4)
RBC # BLD: 3.16 M/UL — LOW (ref 3.8–5.2)
RBC # BLD: 3.6 M/UL — LOW (ref 3.8–5.2)
RBC # FLD: 14.5 % — SIGNIFICANT CHANGE UP (ref 10.3–14.5)
RBC # FLD: 14.5 % — SIGNIFICANT CHANGE UP (ref 10.3–14.5)
T PALLIDUM AB TITR SER: NEGATIVE — SIGNIFICANT CHANGE UP
WBC # BLD: 11.77 K/UL — HIGH (ref 3.8–10.5)
WBC # BLD: 13.43 K/UL — HIGH (ref 3.8–10.5)
WBC # FLD AUTO: 11.77 K/UL — HIGH (ref 3.8–10.5)
WBC # FLD AUTO: 13.43 K/UL — HIGH (ref 3.8–10.5)

## 2023-06-14 PROCEDURE — 59514 CESAREAN DELIVERY ONLY: CPT | Mod: U7,UB,GC

## 2023-06-14 PROCEDURE — 88307 TISSUE EXAM BY PATHOLOGIST: CPT | Mod: 26

## 2023-06-14 DEVICE — SURGICEL FIBRILLAR 1 X 2": Type: IMPLANTABLE DEVICE | Status: FUNCTIONAL

## 2023-06-14 RX ORDER — TETANUS TOXOID, REDUCED DIPHTHERIA TOXOID AND ACELLULAR PERTUSSIS VACCINE, ADSORBED 5; 2.5; 8; 8; 2.5 [IU]/.5ML; [IU]/.5ML; UG/.5ML; UG/.5ML; UG/.5ML
0.5 SUSPENSION INTRAMUSCULAR ONCE
Refills: 0 | Status: DISCONTINUED | OUTPATIENT
Start: 2023-06-14 | End: 2023-06-17

## 2023-06-14 RX ORDER — LEVOTHYROXINE SODIUM 125 MCG
50 TABLET ORAL DAILY
Refills: 0 | Status: DISCONTINUED | OUTPATIENT
Start: 2023-06-14 | End: 2023-06-17

## 2023-06-14 RX ORDER — SODIUM CHLORIDE 9 MG/ML
1000 INJECTION, SOLUTION INTRAVENOUS
Refills: 0 | Status: DISCONTINUED | OUTPATIENT
Start: 2023-06-14 | End: 2023-06-16

## 2023-06-14 RX ORDER — IBUPROFEN 200 MG
600 TABLET ORAL EVERY 6 HOURS
Refills: 0 | Status: COMPLETED | OUTPATIENT
Start: 2023-06-15 | End: 2024-05-13

## 2023-06-14 RX ORDER — FAMOTIDINE 10 MG/ML
20 INJECTION INTRAVENOUS ONCE
Refills: 0 | Status: COMPLETED | OUTPATIENT
Start: 2023-06-14 | End: 2023-06-14

## 2023-06-14 RX ORDER — ACETAMINOPHEN 500 MG
975 TABLET ORAL
Refills: 0 | Status: DISCONTINUED | OUTPATIENT
Start: 2023-06-15 | End: 2023-06-17

## 2023-06-14 RX ORDER — LANOLIN
1 OINTMENT (GRAM) TOPICAL EVERY 6 HOURS
Refills: 0 | Status: DISCONTINUED | OUTPATIENT
Start: 2023-06-14 | End: 2023-06-17

## 2023-06-14 RX ORDER — DIPHENHYDRAMINE HCL 50 MG
25 CAPSULE ORAL EVERY 6 HOURS
Refills: 0 | Status: DISCONTINUED | OUTPATIENT
Start: 2023-06-14 | End: 2023-06-17

## 2023-06-14 RX ORDER — CEFAZOLIN SODIUM 1 G
2000 VIAL (EA) INJECTION ONCE
Refills: 0 | Status: COMPLETED | OUTPATIENT
Start: 2023-06-14 | End: 2023-06-14

## 2023-06-14 RX ORDER — OXYTOCIN 10 UNIT/ML
333.33 VIAL (ML) INJECTION
Qty: 20 | Refills: 0 | Status: DISCONTINUED | OUTPATIENT
Start: 2023-06-14 | End: 2023-06-16

## 2023-06-14 RX ORDER — SIMETHICONE 80 MG/1
80 TABLET, CHEWABLE ORAL EVERY 4 HOURS
Refills: 0 | Status: DISCONTINUED | OUTPATIENT
Start: 2023-06-14 | End: 2023-06-17

## 2023-06-14 RX ORDER — AZITHROMYCIN 500 MG/1
500 TABLET, FILM COATED ORAL ONCE
Refills: 0 | Status: COMPLETED | OUTPATIENT
Start: 2023-06-14 | End: 2023-06-14

## 2023-06-14 RX ORDER — CITRIC ACID/SODIUM CITRATE 300-500 MG
30 SOLUTION, ORAL ORAL ONCE
Refills: 0 | Status: COMPLETED | OUTPATIENT
Start: 2023-06-14 | End: 2023-06-14

## 2023-06-14 RX ORDER — OXYCODONE HYDROCHLORIDE 5 MG/1
5 TABLET ORAL EVERY 4 HOURS
Refills: 0 | Status: DISCONTINUED | OUTPATIENT
Start: 2023-06-15 | End: 2023-06-17

## 2023-06-14 RX ORDER — OXYCODONE HYDROCHLORIDE 5 MG/1
5 TABLET ORAL EVERY 4 HOURS
Refills: 0 | Status: COMPLETED | OUTPATIENT
Start: 2023-06-15 | End: 2023-06-22

## 2023-06-14 RX ORDER — MAGNESIUM HYDROXIDE 400 MG/1
30 TABLET, CHEWABLE ORAL
Refills: 0 | Status: DISCONTINUED | OUTPATIENT
Start: 2023-06-14 | End: 2023-06-17

## 2023-06-14 RX ORDER — SODIUM CHLORIDE 9 MG/ML
1000 INJECTION, SOLUTION INTRAVENOUS
Refills: 0 | Status: DISCONTINUED | OUTPATIENT
Start: 2023-06-14 | End: 2023-06-14

## 2023-06-14 RX ORDER — KETOROLAC TROMETHAMINE 30 MG/ML
30 SYRINGE (ML) INJECTION EVERY 6 HOURS
Refills: 0 | Status: DISCONTINUED | OUTPATIENT
Start: 2023-06-14 | End: 2023-06-15

## 2023-06-14 RX ORDER — OXYTOCIN 10 UNIT/ML
333.33 VIAL (ML) INJECTION
Qty: 20 | Refills: 0 | Status: DISCONTINUED | OUTPATIENT
Start: 2023-06-14 | End: 2023-06-14

## 2023-06-14 RX ORDER — SODIUM CHLORIDE 9 MG/ML
1000 INJECTION, SOLUTION INTRAVENOUS ONCE
Refills: 0 | Status: COMPLETED | OUTPATIENT
Start: 2023-06-14 | End: 2023-06-14

## 2023-06-14 RX ORDER — HEPARIN SODIUM 5000 [USP'U]/ML
5000 INJECTION INTRAVENOUS; SUBCUTANEOUS EVERY 12 HOURS
Refills: 0 | Status: COMPLETED | OUTPATIENT
Start: 2023-06-14 | End: 2023-06-17

## 2023-06-14 RX ADMIN — Medication 30 MILLILITER(S): at 01:34

## 2023-06-14 RX ADMIN — Medication 100 MILLIGRAM(S): at 01:39

## 2023-06-14 RX ADMIN — SIMETHICONE 80 MILLIGRAM(S): 80 TABLET, CHEWABLE ORAL at 17:44

## 2023-06-14 RX ADMIN — SIMETHICONE 80 MILLIGRAM(S): 80 TABLET, CHEWABLE ORAL at 13:32

## 2023-06-14 RX ADMIN — Medication 30 MILLIGRAM(S): at 14:00

## 2023-06-14 RX ADMIN — OXYCODONE HYDROCHLORIDE 5 MILLIGRAM(S): 5 TABLET ORAL at 23:36

## 2023-06-14 RX ADMIN — Medication 30 MILLIGRAM(S): at 17:44

## 2023-06-14 RX ADMIN — Medication 50 MICROGRAM(S): at 06:54

## 2023-06-14 RX ADMIN — Medication 30 MILLIGRAM(S): at 18:10

## 2023-06-14 RX ADMIN — Medication 30 MILLIGRAM(S): at 07:19

## 2023-06-14 RX ADMIN — OXYCODONE HYDROCHLORIDE 5 MILLIGRAM(S): 5 TABLET ORAL at 23:06

## 2023-06-14 RX ADMIN — SODIUM CHLORIDE 2000 MILLILITER(S): 9 INJECTION, SOLUTION INTRAVENOUS at 00:15

## 2023-06-14 RX ADMIN — FAMOTIDINE 20 MILLIGRAM(S): 10 INJECTION INTRAVENOUS at 01:35

## 2023-06-14 RX ADMIN — Medication 30 MILLIGRAM(S): at 06:54

## 2023-06-14 RX ADMIN — SODIUM CHLORIDE 125 MILLILITER(S): 9 INJECTION, SOLUTION INTRAVENOUS at 04:24

## 2023-06-14 RX ADMIN — Medication 975 MILLIGRAM(S): at 20:45

## 2023-06-14 RX ADMIN — AZITHROMYCIN 255 MILLIGRAM(S): 500 TABLET, FILM COATED ORAL at 01:55

## 2023-06-14 RX ADMIN — Medication 1000 MILLIUNIT(S)/MIN: at 04:23

## 2023-06-14 RX ADMIN — HEPARIN SODIUM 5000 UNIT(S): 5000 INJECTION INTRAVENOUS; SUBCUTANEOUS at 17:44

## 2023-06-14 RX ADMIN — Medication 975 MILLIGRAM(S): at 21:00

## 2023-06-14 RX ADMIN — SODIUM CHLORIDE 125 MILLILITER(S): 9 INJECTION, SOLUTION INTRAVENOUS at 02:01

## 2023-06-14 RX ADMIN — Medication 30 MILLIGRAM(S): at 13:32

## 2023-06-14 RX ADMIN — Medication 1000 MILLIUNIT(S)/MIN: at 02:51

## 2023-06-14 NOTE — DISCHARGE NOTE OB - HOSPITAL COURSE
32 yo F admitted in early labor  s/p rpt c/s @38.2wks; uncomplicated delivery  routine post partum care

## 2023-06-14 NOTE — DISCHARGE NOTE OB - CARE PLAN
Principal Discharge DX:	 delivery delivered  Assessment and plan of treatment:	Continue breastfeeding.  Motrin as needed for pain.  Ambulate daily.  No heavy lifting or anything per vagina x 6 weeks - no sex, tampons, douching, tub baths, etc.  Follow up in office in 2 weeks for incision check, and then at 6 weeks for postpartum check.  Secondary Diagnosis:	Chronic anemia  Assessment and plan of treatment:	take iron, folic acid, vitamin C, and prenatal vitamins. eat iron fortified food   1

## 2023-06-14 NOTE — DISCHARGE NOTE OB - NS MD DC FALL RISK RISK
For information on Fall & Injury Prevention, visit: [email or web address] OR  [email or web address] OR  https://www.cdc.gov/steadi/patient.html

## 2023-06-14 NOTE — DISCHARGE NOTE OB - CARE PROVIDER_API CALL
Brigid Lainez  Obstetrics and Gynecology  [address], [address]nd [place] [address] [place] [address]  Phone: [phone]  Fax: [phone]  Established Patient  Follow Up Time: 2 weeks

## 2023-06-14 NOTE — DISCHARGE NOTE OB - MEDICATION SUMMARY - MEDICATIONS TO TAKE
I will START or STAY ON the medications listed below when I get home from the hospital:    ibuprofen 600 mg oral tablet  -- 1 tab(s) by mouth every 6 hours as needed for  moderate pain  -- Indication: For for pain    acetaminophen 325 mg oral capsule  -- 1 cap(s) by mouth every 6 hours alternate with motrin as needed  -- Indication: For for pain    simethicone 125 mg oral capsule  -- 1 cap(s) by mouth 2 times a day as needed for  gas pain  -- Indication: For for gas pain    levothyroxine 50 mcg (0.05 mg) oral tablet  -- 1 tab(s) by mouth once a day  -- Indication: For at home medication

## 2023-06-14 NOTE — PATIENT PROFILE OB - FALL HARM RISK - UNIVERSAL INTERVENTIONS
Universal Safety Interventions Bed in lowest position, wheels locked, appropriate side rails in place/Call bell, personal items and telephone in reach/Instruct patient to call for assistance before getting out of bed or chair/Non-slip footwear when patient is out of bed/[place] to call system/Physically safe environment - no spills, clutter or unnecessary equipment/Purposeful Proactive Rounding/Room/bathroom lighting operational, light cord in reach

## 2023-06-14 NOTE — DISCHARGE NOTE OB - PATIENT PORTAL LINK FT
You can access the FollowMyHealth Patient Portal offered by [facility] by registering at the following website: [email or web address]. By joining [employer]’s FollowMyHealth portal, you will also be able to view your health information using other applications (apps) compatible with our system.

## 2023-06-14 NOTE — PATIENT PROFILE OB - COMFORT LEVEL, ACCEPTABLE
5 Benzoyl Peroxide Counseling: Patient counseled that medicine may cause skin irritation and bleach clothing.  In the event of skin irritation, the patient was advised to reduce the amount of the drug applied or use it less frequently.   The patient verbalized understanding of the proper use and possible adverse effects of benzoyl peroxide.  All of the patient's questions and concerns were addressed.

## 2023-06-14 NOTE — PROGRESS NOTE ADULT - SUBJECTIVE AND OBJECTIVE BOX
late entry due to pt care    post op check  offers no acute complaints  villalta in place    Vital Signs Last 24 Hrs  T(C): 36.8 (14 Jun 2023 06:30), Max: 36.8 (13 Jun 2023 21:46)  T(F): 98.2 (14 Jun 2023 06:30), Max: 98.2 (13 Jun 2023 21:46)  HR: 82 (14 Jun 2023 06:30) (76 - 121)  BP: 103/62 (14 Jun 2023 06:30) (69/45 - 119/75)  BP(mean): 89 (14 Jun 2023 06:06) (47 - 89)  RR: 18 (14 Jun 2023 06:30) (17 - 20)  SpO2: 97% (14 Jun 2023 06:30) (97% - 99%)    Parameters below as of 14 Jun 2023 06:30  Patient On (Oxygen Delivery Method): room air    abd inc site c/d/intact no erythema/no edema  lochia minimal  villalta in place

## 2023-06-14 NOTE — CHART NOTE - NSCHARTNOTEFT_GEN_A_CORE
RN informed PA that patient has difficulty voiding s/p villalta removal at 2pm\  Patient seen at bedside  reports urge to urinate  denies  worsening abd pain, worsening vaginal bleeding, sob, chest pain or any other concern   at bedside  attempted to breast feed      Vital Signs Last 24 Hrs  T(C): 36.7 (2023 19:04), Max: 36.8 (2023 21:46)  T(F): 98.1 (2023 19:04), Max: 98.3 (2023 10:51)  HR: 95 (2023 19:04) (66 - 121)  BP: 95/60 (2023 19:04) (69/45 - 119/75)  BP(mean): 89 (2023 06:06) (47 - 89)  RR: 18 (2023 19:04) (17 - 20)  SpO2: 98% (2023 19:04) (96% - 99%)    Parameters below as of 2023 19:04  Patient On (Oxygen Delivery Method): room air                          9.5    13.43 )-----------( 201      ( 2023 18:43 )             28.5       abd: soft, non tender, incision c/d/i ( dermabond noted)  gyn: minimal bleeding noted in peripad    a/p POD #0 s/p repeat c/s @38w3d meconium, now with urinary retention, otherwise stable  bladder scan performed, 500ml noted  will reinsert villalta for 12 hours  continue post op care  d/w Jorge Pérez attending

## 2023-06-15 ENCOUNTER — APPOINTMENT (OUTPATIENT)
Dept: ANTEPARTUM | Facility: CLINIC | Age: 33
End: 2023-06-15

## 2023-06-15 DIAGNOSIS — Z98.891 HISTORY OF UTERINE SCAR FROM PREVIOUS SURGERY: ICD-10-CM

## 2023-06-15 DIAGNOSIS — D64.9 ANEMIA, UNSPECIFIED: ICD-10-CM

## 2023-06-15 DIAGNOSIS — O09.33 SUPERVISION OF PREGNANCY WITH INSUFFICIENT ANTENATAL CARE, THIRD TRIMESTER: ICD-10-CM

## 2023-06-15 LAB
BASOPHILS # BLD AUTO: 0.03 K/UL — SIGNIFICANT CHANGE UP (ref 0–0.2)
BASOPHILS NFR BLD AUTO: 0.3 % — SIGNIFICANT CHANGE UP (ref 0–2)
CFTR MUT TESTED BLD/T: NEGATIVE
EOSINOPHIL # BLD AUTO: 0.11 K/UL — SIGNIFICANT CHANGE UP (ref 0–0.5)
EOSINOPHIL NFR BLD AUTO: 0.9 % — SIGNIFICANT CHANGE UP (ref 0–6)
HCT VFR BLD CALC: 26.8 % — LOW (ref 34.5–45)
HGB BLD-MCNC: 8.9 G/DL — LOW (ref 11.5–15.5)
IMM GRANULOCYTES NFR BLD AUTO: 0.5 % — SIGNIFICANT CHANGE UP (ref 0–0.9)
LYMPHOCYTES # BLD AUTO: 26.4 % — SIGNIFICANT CHANGE UP (ref 13–44)
LYMPHOCYTES # BLD AUTO: 3.16 K/UL — SIGNIFICANT CHANGE UP (ref 1–3.3)
MCHC RBC-ENTMCNC: 29.9 PG — SIGNIFICANT CHANGE UP (ref 27–34)
MCHC RBC-ENTMCNC: 33.2 GM/DL — SIGNIFICANT CHANGE UP (ref 32–36)
MCV RBC AUTO: 89.9 FL — SIGNIFICANT CHANGE UP (ref 80–100)
MONOCYTES # BLD AUTO: 1.18 K/UL — HIGH (ref 0–0.9)
MONOCYTES NFR BLD AUTO: 9.8 % — SIGNIFICANT CHANGE UP (ref 2–14)
NEUTROPHILS # BLD AUTO: 7.45 K/UL — HIGH (ref 1.8–7.4)
NEUTROPHILS NFR BLD AUTO: 62.1 % — SIGNIFICANT CHANGE UP (ref 43–77)
NRBC # BLD: 0 /100 WBCS — SIGNIFICANT CHANGE UP (ref 0–0)
PLATELET # BLD AUTO: 192 K/UL — SIGNIFICANT CHANGE UP (ref 150–400)
RBC # BLD: 2.98 M/UL — LOW (ref 3.8–5.2)
RBC # FLD: 14.6 % — HIGH (ref 10.3–14.5)
WBC # BLD: 11.99 K/UL — HIGH (ref 3.8–10.5)
WBC # FLD AUTO: 11.99 K/UL — HIGH (ref 3.8–10.5)

## 2023-06-15 RX ORDER — IBUPROFEN 200 MG
600 TABLET ORAL EVERY 6 HOURS
Refills: 0 | Status: DISCONTINUED | OUTPATIENT
Start: 2023-06-15 | End: 2023-06-17

## 2023-06-15 RX ADMIN — SIMETHICONE 80 MILLIGRAM(S): 80 TABLET, CHEWABLE ORAL at 18:21

## 2023-06-15 RX ADMIN — Medication 600 MILLIGRAM(S): at 03:00

## 2023-06-15 RX ADMIN — Medication 600 MILLIGRAM(S): at 18:43

## 2023-06-15 RX ADMIN — Medication 975 MILLIGRAM(S): at 06:52

## 2023-06-15 RX ADMIN — Medication 50 MICROGRAM(S): at 06:47

## 2023-06-15 RX ADMIN — MAGNESIUM HYDROXIDE 30 MILLILITER(S): 400 TABLET, CHEWABLE ORAL at 09:23

## 2023-06-15 RX ADMIN — Medication 975 MILLIGRAM(S): at 23:05

## 2023-06-15 RX ADMIN — OXYCODONE HYDROCHLORIDE 5 MILLIGRAM(S): 5 TABLET ORAL at 16:49

## 2023-06-15 RX ADMIN — SIMETHICONE 80 MILLIGRAM(S): 80 TABLET, CHEWABLE ORAL at 09:22

## 2023-06-15 RX ADMIN — Medication 600 MILLIGRAM(S): at 09:22

## 2023-06-15 RX ADMIN — Medication 975 MILLIGRAM(S): at 12:05

## 2023-06-15 RX ADMIN — OXYCODONE HYDROCHLORIDE 5 MILLIGRAM(S): 5 TABLET ORAL at 15:49

## 2023-06-15 RX ADMIN — Medication 600 MILLIGRAM(S): at 10:22

## 2023-06-15 RX ADMIN — Medication 600 MILLIGRAM(S): at 02:50

## 2023-06-15 RX ADMIN — Medication 975 MILLIGRAM(S): at 13:05

## 2023-06-15 RX ADMIN — SIMETHICONE 80 MILLIGRAM(S): 80 TABLET, CHEWABLE ORAL at 23:06

## 2023-06-15 RX ADMIN — HEPARIN SODIUM 5000 UNIT(S): 5000 INJECTION INTRAVENOUS; SUBCUTANEOUS at 06:47

## 2023-06-15 RX ADMIN — Medication 975 MILLIGRAM(S): at 07:32

## 2023-06-15 RX ADMIN — HEPARIN SODIUM 5000 UNIT(S): 5000 INJECTION INTRAVENOUS; SUBCUTANEOUS at 18:21

## 2023-06-15 RX ADMIN — Medication 975 MILLIGRAM(S): at 23:40

## 2023-06-15 RX ADMIN — Medication 600 MILLIGRAM(S): at 18:21

## 2023-06-15 NOTE — PROGRESS NOTE ADULT - SUBJECTIVE AND OBJECTIVE BOX
Patient seen at bedside resting comfortably offers no new complaints. not yet ambulating, villalta in place secondary to postoperative urinary retention, clear urine noted.  + flatus;  no bowel movement; tolerating regular liquid diet.  Pt both breast and bottle feeding. Pt denies headache, weakness, chest pain, shortness of breath, blurry vision or epigastric pain, N/V/D,  palpitations, worsening vaginal bleeding.    Vital Signs Last 24 Hrs  T(C): 36.8 (15 Talha 2023 04:51), Max: 36.8 (14 Jun 2023 10:51)  T(F): 98.3 (15 Talha 2023 04:51), Max: 98.3 (14 Jun 2023 10:51)  HR: 78 (15 Talha 2023 04:51) (66 - 95)  BP: 104/64 (15 Talha 2023 04:51) (92/54 - 104/64)  BP(mean): --  RR: 18 (15 Talha 2023 04:51) (18 - 18)  SpO2: 98% (15 Talha 2023 04:51) (96% - 98%)    Parameters below as of 15 Talha 2023 04:51  Patient On (Oxygen Delivery Method): room air        Gen: A&O x 3, NAD  Chest: CTA B/L  Cardiac: S1,S2  RRR  Breast: Soft, nontender, nonengorged  Abdomen: +BS; soft; Nontender, nondistended; dressing removed incision C/D/I dermabond in place  Gyn: minimal lochia   Extremities: Nontender, venodynes in place, no calf tenderness                          8.9    11.99 )-----------( 192      ( 15 Talha 2023 06:15 )             26.8       A/P: 34yo presenting with h/o previous c/s with contractions in early labor POD #1 s/p repeat c/s @ 38.2wks, complicated about postoperative urinary retention villalta reinserted for 12hrs, chronic anemia, hypothyroidism (no meds), pt stable  -Pain management as needed  -DVT ppx: OOB and ambulate, heparin  -f/u Rpt CBC   -iron, vitC, pnv  - f/u trial of void; villalta to be discontinued at 10am after 12hrs   - regular diet   -Encourage breastfeeding   -pt seen and examined with Dr. Lainez

## 2023-06-15 NOTE — PROGRESS NOTE ADULT - PROBLEM SELECTOR PLAN 2
A/P: 32yo presenting with h/o previous c/s with contractions in early labor POD #1 s/p repeat c/s @ 38.2wks, complicated about postoperative urinary retention villalta reinserted for 12hrs, chronic anemia, hypothyroidism (no meds), pt stable  -Pain management as needed  -DVT ppx: OOB and ambulate, heparin  -f/u Rpt CBC   -iron, vitC, pnv  - f/u trial of void; villalta to be discontinued at 10am after 12hrs   - regular diet   -Encourage breastfeeding   -pt seen and examined with Dr. Lainez

## 2023-06-16 LAB
BASOPHILS # BLD AUTO: 0.04 K/UL — SIGNIFICANT CHANGE UP (ref 0–0.2)
BASOPHILS NFR BLD AUTO: 0.4 % — SIGNIFICANT CHANGE UP (ref 0–2)
EOSINOPHIL # BLD AUTO: 0.41 K/UL — SIGNIFICANT CHANGE UP (ref 0–0.5)
EOSINOPHIL NFR BLD AUTO: 3.7 % — SIGNIFICANT CHANGE UP (ref 0–6)
HCT VFR BLD CALC: 30 % — LOW (ref 34.5–45)
HGB BLD-MCNC: 10 G/DL — LOW (ref 11.5–15.5)
IMM GRANULOCYTES NFR BLD AUTO: 0.5 % — SIGNIFICANT CHANGE UP (ref 0–0.9)
LYMPHOCYTES # BLD AUTO: 2.89 K/UL — SIGNIFICANT CHANGE UP (ref 1–3.3)
LYMPHOCYTES # BLD AUTO: 26.4 % — SIGNIFICANT CHANGE UP (ref 13–44)
MCHC RBC-ENTMCNC: 30.6 PG — SIGNIFICANT CHANGE UP (ref 27–34)
MCHC RBC-ENTMCNC: 33.3 GM/DL — SIGNIFICANT CHANGE UP (ref 32–36)
MCV RBC AUTO: 91.7 FL — SIGNIFICANT CHANGE UP (ref 80–100)
MONOCYTES # BLD AUTO: 0.62 K/UL — SIGNIFICANT CHANGE UP (ref 0–0.9)
MONOCYTES NFR BLD AUTO: 5.7 % — SIGNIFICANT CHANGE UP (ref 2–14)
NEUTROPHILS # BLD AUTO: 6.95 K/UL — SIGNIFICANT CHANGE UP (ref 1.8–7.4)
NEUTROPHILS NFR BLD AUTO: 63.3 % — SIGNIFICANT CHANGE UP (ref 43–77)
NRBC # BLD: 0 /100 WBCS — SIGNIFICANT CHANGE UP (ref 0–0)
PLATELET # BLD AUTO: 228 K/UL — SIGNIFICANT CHANGE UP (ref 150–400)
RBC # BLD: 3.27 M/UL — LOW (ref 3.8–5.2)
RBC # FLD: 14.6 % — HIGH (ref 10.3–14.5)
WBC # BLD: 10.96 K/UL — HIGH (ref 3.8–10.5)
WBC # FLD AUTO: 10.96 K/UL — HIGH (ref 3.8–10.5)

## 2023-06-16 RX ORDER — IBUPROFEN 200 MG
1 TABLET ORAL
Qty: 40 | Refills: 0
Start: 2023-06-16 | End: 2023-06-25

## 2023-06-16 RX ORDER — SIMETHICONE 80 MG/1
1 TABLET, CHEWABLE ORAL
Qty: 20 | Refills: 0
Start: 2023-06-16 | End: 2023-06-25

## 2023-06-16 RX ORDER — ACETAMINOPHEN 500 MG
1 TABLET ORAL
Qty: 40 | Refills: 0
Start: 2023-06-16 | End: 2023-06-25

## 2023-06-16 RX ORDER — LEVOTHYROXINE SODIUM 125 MCG
1 TABLET ORAL
Qty: 0 | Refills: 0 | DISCHARGE
Start: 2023-06-16

## 2023-06-16 RX ADMIN — Medication 600 MILLIGRAM(S): at 09:49

## 2023-06-16 RX ADMIN — Medication 975 MILLIGRAM(S): at 06:25

## 2023-06-16 RX ADMIN — Medication 600 MILLIGRAM(S): at 08:49

## 2023-06-16 RX ADMIN — Medication 600 MILLIGRAM(S): at 14:10

## 2023-06-16 RX ADMIN — HEPARIN SODIUM 5000 UNIT(S): 5000 INJECTION INTRAVENOUS; SUBCUTANEOUS at 17:12

## 2023-06-16 RX ADMIN — Medication 600 MILLIGRAM(S): at 02:45

## 2023-06-16 RX ADMIN — Medication 600 MILLIGRAM(S): at 03:15

## 2023-06-16 RX ADMIN — Medication 975 MILLIGRAM(S): at 17:11

## 2023-06-16 RX ADMIN — Medication 600 MILLIGRAM(S): at 15:10

## 2023-06-16 RX ADMIN — Medication 975 MILLIGRAM(S): at 13:13

## 2023-06-16 RX ADMIN — Medication 975 MILLIGRAM(S): at 12:13

## 2023-06-16 RX ADMIN — Medication 975 MILLIGRAM(S): at 05:53

## 2023-06-16 RX ADMIN — Medication 975 MILLIGRAM(S): at 22:45

## 2023-06-16 RX ADMIN — SIMETHICONE 80 MILLIGRAM(S): 80 TABLET, CHEWABLE ORAL at 17:11

## 2023-06-16 RX ADMIN — SIMETHICONE 80 MILLIGRAM(S): 80 TABLET, CHEWABLE ORAL at 08:49

## 2023-06-16 RX ADMIN — OXYCODONE HYDROCHLORIDE 5 MILLIGRAM(S): 5 TABLET ORAL at 14:12

## 2023-06-16 RX ADMIN — HEPARIN SODIUM 5000 UNIT(S): 5000 INJECTION INTRAVENOUS; SUBCUTANEOUS at 05:52

## 2023-06-16 RX ADMIN — OXYCODONE HYDROCHLORIDE 5 MILLIGRAM(S): 5 TABLET ORAL at 15:10

## 2023-06-16 RX ADMIN — Medication 50 MICROGRAM(S): at 05:52

## 2023-06-16 RX ADMIN — Medication 975 MILLIGRAM(S): at 21:41

## 2023-06-16 NOTE — PROGRESS NOTE ADULT - SUBJECTIVE AND OBJECTIVE BOX
PA NOTE:  pod#2 s/p rpt cs at 38 2/7wks   in the room  pt doing well     Patient seen at bedside resting comfortably offers no new complaints. + Ambulation, + void without difficulty, + flatus; tolerating regular diet. both breastfeeding and bottle feeding. Denies HA, CP, SOB, N/V/D,  no bm; dizziness, palpitations, worsening abdominal pain, worsening vaginal bleeding, or any other concerns.     Vital Signs Last 24 Hrs  T(C): 37 (2023 06:00), Max: 37.1 (15 Talha 2023 12:58)  T(F): 98.6 (2023 06:00), Max: 98.8 (15 Talha 2023 12:58)  HR: 75 (2023 06:00) (75 - 93)  BP: 109/73 (2023 06:00) (102/70 - 117/66)  BP(mean): --  RR: 19 (2023 06:00) (16 - 19)  SpO2: 98% (2023 06:00) (98% - 98%)    Parameters below as of 2023 06:00  Patient On (Oxygen Delivery Method): room air      CAPILLARY BLOOD GLUCOSE          Gen: A&O x 3, NAD  Chest: CTABL  Cardiac: S1+S2+ RRR  Breast: Soft, nontender, nonengorged  Abdomen: +BS; soft; Nontender, nondistended,   Gyn: Minimal lochia  Extremities: Nontender, DTRS 2+, no worsening edema     PA NOTE:  pod#2 s/p rpt cs at 38 2/7wks   in the room  pt doing well     Patient seen at bedside resting comfortably offers no new complaints. + Ambulation, + void without difficulty, + flatus; tolerating regular diet. both breastfeeding and bottle feeding. Denies HA, CP, SOB, N/V/D,  no bm; dizziness, palpitations, worsening abdominal pain, worsening vaginal bleeding, or any other concerns.     Vital Signs Last 24 Hrs  T(C): 37 (2023 06:00), Max: 37.1 (15 Talha 2023 12:58)  T(F): 98.6 (2023 06:00), Max: 98.8 (15 Talha 2023 12:58)  HR: 75 (2023 06:00) (75 - 93)  BP: 109/73 (2023 06:00) (102/70 - 117/66)  BP(mean): --  RR: 19 (2023 06:00) (16 - 19)  SpO2: 98% (2023 06:00) (98% - 98%)    Parameters below as of 2023 06:00  Patient On (Oxygen Delivery Method): room air      CAPILLARY BLOOD GLUCOSE          Gen: A&O x 3, NAD  Chest: CTABL  Cardiac: S1+S2+ RRR  Breast: Soft, nontender, nonengorged  Abdomen: +BS; soft; Nontender, nondistended, inc site c/d/intact no erythema/edema +derma bond   Gyn: Minimal lochia  Extremities: Nontender, DTRS 2+, no worsening edema

## 2023-06-17 VITALS
RESPIRATION RATE: 18 BRPM | DIASTOLIC BLOOD PRESSURE: 71 MMHG | TEMPERATURE: 98 F | OXYGEN SATURATION: 98 % | SYSTOLIC BLOOD PRESSURE: 104 MMHG | HEART RATE: 71 BPM

## 2023-06-17 RX ADMIN — Medication 975 MILLIGRAM(S): at 09:57

## 2023-06-17 RX ADMIN — Medication 0.5 MILLILITER(S): at 06:58

## 2023-06-17 RX ADMIN — Medication 600 MILLIGRAM(S): at 12:05

## 2023-06-17 RX ADMIN — Medication 600 MILLIGRAM(S): at 05:59

## 2023-06-17 RX ADMIN — HEPARIN SODIUM 5000 UNIT(S): 5000 INJECTION INTRAVENOUS; SUBCUTANEOUS at 05:59

## 2023-06-17 RX ADMIN — Medication 600 MILLIGRAM(S): at 00:46

## 2023-06-17 RX ADMIN — Medication 600 MILLIGRAM(S): at 11:43

## 2023-06-17 RX ADMIN — Medication 50 MICROGRAM(S): at 05:59

## 2023-06-17 RX ADMIN — Medication 600 MILLIGRAM(S): at 01:41

## 2023-06-17 RX ADMIN — Medication 600 MILLIGRAM(S): at 06:47

## 2023-06-17 RX ADMIN — Medication 975 MILLIGRAM(S): at 10:20

## 2023-06-17 NOTE — PROGRESS NOTE ADULT - ASSESSMENT
A/P: POD #3 s/p rpt c/s @38.2wks  -d/c home   -instructions verbalized  -follow up in 1-2weeks in office for incision check  -d/w dr Diaz
PA NOTE:  pod#2 s/p rpt cs at 38 2/7wks   in the room  pt doing well                         10.0   10.96 )-----------( 228      ( 2023 06:00 )             30.0   -Pain management as needed  -cont post op care  -OOB and ambulate  -encourage insentive spirometer use  -Encourage breastfeeding   -d/w dr eagle 
A/P: 34yo presenting with h/o previous c/s with contractions in early labor POD #1 s/p repeat c/s @ 38.2wks, complicated about postoperative urinary retention villalta reinserted for 12hrs, chronic anemia, hypothyroidism (no meds), pt stable  -Pain management as needed  -DVT ppx: OOB and ambulate, heparin  -f/u Rpt CBC   -iron, vitC, pnv  - f/u trial of void; villalta to be discontinued at 10am after 12hrs   - regular diet   -Encourage breastfeeding   -pt seen and examined with Dr. Lainez

## 2023-06-17 NOTE — PROGRESS NOTE ADULT - SUBJECTIVE AND OBJECTIVE BOX
Patient seen at bedside resting comfortably offers no new complaints. + Ambulation, + void without difficulty, + flatus;  + bm;  tolerating regular diet. both breastfeeding and bottle feeding. Denies HA, blurry vision or epigastric pain, CP, SOB, N/V/D,  dizziness, palpitations, worsening vaginal bleeding.     Vital Signs Last 24 Hrs  T(C): 36.9 (17 Jun 2023 06:08), Max: 36.9 (16 Jun 2023 14:18)  T(F): 98.4 (17 Jun 2023 06:08), Max: 98.5 (16 Jun 2023 14:18)  HR: 71 (17 Jun 2023 06:08) (61 - 71)  BP: 104/71 (17 Jun 2023 06:08) (101/65 - 112/69)  BP(mean): --  RR: 18 (17 Jun 2023 06:08) (18 - 18)  SpO2: 98% (17 Jun 2023 06:08) (97% - 99%)    Parameters below as of 17 Jun 2023 06:08  Patient On (Oxygen Delivery Method): room air        Gen: A&O x 3, NAD  Chest: CTA B/L  Cardiac: S1,S2  RRR  Breast: Soft, nontender, nonengorged  Abdomen: +BS; soft; Nontender, nondistended, Incision C/D/I steri strips in place   Gyn: Minimal lochia  Extremities: Nontender, DTRS 2+, no worsening edema                          10.0   10.96 )-----------( 228      ( 16 Jun 2023 06:00 )             30.0       A/P: POD #3 s/p rpt c/s @38.2wks  -d/c home   -instructions verbalized  -follow up in 1-2weeks in office for incision check  -d/w dr Diaz

## 2023-06-17 NOTE — PROGRESS NOTE ADULT - PROBLEM SELECTOR PLAN 1
A/P: POD #3 s/p rpt c/s @38.2wks  -d/c home   -instructions verbalized  -follow up in 1-2weeks in office for incision check  -d/w dr Diaz
no home meds

## 2023-06-24 ENCOUNTER — EMERGENCY (EMERGENCY)
Facility: HOSPITAL | Age: 33
LOS: 1 days | Discharge: HOME | End: 2023-06-24
Attending: EMERGENCY MEDICINE
Payer: MEDICAID

## 2023-06-24 VITALS
DIASTOLIC BLOOD PRESSURE: 70 MMHG | TEMPERATURE: 98 F | SYSTOLIC BLOOD PRESSURE: 101 MMHG | HEART RATE: 79 BPM | RESPIRATION RATE: 18 BRPM | OXYGEN SATURATION: 98 % | WEIGHT: 141.1 LBS | HEIGHT: 61 IN

## 2023-06-24 DIAGNOSIS — Z98.891 HISTORY OF UTERINE SCAR FROM PREVIOUS SURGERY: Chronic | ICD-10-CM

## 2023-06-24 PROCEDURE — 99285 EMERGENCY DEPT VISIT HI MDM: CPT

## 2023-06-24 NOTE — ED ADULT NURSE NOTE - NSFALLUNIVINTERV_ED_ALL_ED
Bed/Stretcher in lowest position, wheels locked, appropriate side rails in place/Call bell, personal items and telephone in reach/Instruct patient to call for assistance before getting out of bed/chair/stretcher/Non-slip footwear applied when patient is off stretcher/[place] to call system/Physically safe environment - no spills, clutter or unnecessary equipment/Purposeful proactive rounding/Room/bathroom lighting operational, light cord in reach

## 2023-06-24 NOTE — ED PROVIDER NOTE - PATIENT PORTAL LINK FT
You can access the FollowMyHealth Patient Portal offered by [facility] by registering at the following website: [email or web address]. By joining [facility]’s FollowMyHealth portal, you will also be able to view your health information using other applications (apps) compatible with our system.

## 2023-06-24 NOTE — ED ADULT NURSE NOTE - OBJECTIVE STATEMENT
pt awake alert oriented x3 with complaint of pain to lower abdomen/incision site/s/p c section 10 days ago ,took pain med at home but not working

## 2023-06-24 NOTE — ED PROVIDER NOTE - PHYSICAL EXAMINATION
Exam:  General: Patient well appearing, vital signs within normal limits  HEENT: airway patent with moist mucous membranes  Cardiac: RRR S1/S2 with strong peripheral pulses  Respiratory: lungs clear without respiratory distress  GI: abdomen soft, low transverse  site without surrounding erythema closed with tissue adhesive, point tenderness along L lateral portion of incision  Neuro: no gross neurologic deficits  Skin: warm, well perfused  Psych: normal mood and affect

## 2023-06-24 NOTE — ED PROVIDER NOTE - NSFOLLOWUPINSTRUCTIONS_ED_ALL_ED_FT
Thank you for choosing [facility] for your health care.    You were seen in the emergency room for abdominal pain.  You had blood work urine testing and a CAT scan of your abdomen and pelvis which did not show any specific complication of your recent surgery or any emergent cause of your abdominal pain.  We are prescribing you medication to take for pain at home.  Please follow-up with your OB/GYN if your pain persists.  Please return to the emergency room for any further emergent or concerning medical issues.

## 2023-06-24 NOTE — ED PROVIDER NOTE - CLINICAL SUMMARY MEDICAL DECISION MAKING FREE TEXT BOX
Patient presenting with postoperative incision site pain.  No obvious infection on exam.  Plan for labs and CT abdomen pelvis to screen for subcutaneous collection, consult GYN service and reevaluate.

## 2023-06-24 NOTE — ED ADULT NURSE NOTE - NSFALLRISKFACTORS_ED_ALL_ED
Letter by Reece Woo MD at      Author: Reece Woo MD Service: -- Author Type: --    Filed:  Encounter Date: 4/16/2019 Status: (Other)         Shai Ellington MD  [address]  [address] [place] [address]                                  April 16, 2019    Patient: Adalgisa Solano   MR Number: [id number]   YOB: 1935   Date of Visit: 4/16/2019     Dear Dr. Chandana MD:    Thank you for referring Adalgisa Solano to me for evaluation. Below are the relevant portions of my assessment and plan of care.    If you have questions, please do not hesitate to call me. I look forward to following Adalgisa along with you.    Sincerely,        Reece Woo MD          CC  No Recipients  Reece Woo MD  4/16/2019  3:55 PM  Sign at close encounter  Assessment and Plan:83 year old female with a history of severe COPD (FEV1 0.89 L, 51%; DLCO 53%), pulmonary HTN, HFpEF, DM, GERD, atrial fibrillation and DVT on anticoagulation, schizoaffective disorder, PAD, HTN, dyslipidemia, admitted  Jan 2019 with acute hypoxemic respiratory failure with evidence of pulmonary edema, Klebsiella and PSDs PNA (per 12/26 sputum Cx), and AECOPD.  She also appears to have some evidence of bronchiectasis per my review of her December 2018 CT scan.    4/16/2019: Patient began having increased cough productive for yellow sputum for the past couple of weeks this month.  She tried doxycycline and prednisone without relief.  She has not obtained her pulmonary function tests yet due to the current symptoms.  Chest x-ray checked today was negative for pneumonia I suspect that she is having a bronchiectasis exacerbation.  She is currently using a flutter valve about twice a day and hypertonic saline nebs twice daily.    Bronchiectasis:  -Increase flutter valve use to 4 times daily for now  -I prescribed Z-Bijan with 1 refill if necessary based on 12/26 2018 sputum cultures  which show Pseudomonas that is pansensitive and Klebsiella that is resistant to penicillin and intermediately resistant to tetracycline  -Bronchiectasis workup labs sent from clinic today  -Patient to bring specimen cup home for sputum culture, not currently able to produce any in clinic    COPD:  -PFTs ordered last visit, post-poned 2/2 acute illnes  -Continue Spiriva, Advair, and albuterol as needed  -Continue home oxygen, adjust as needed for goal oxygen saturation of 88-92% (chronic CO2 retainer)     Cough and suspected nocturnal reflux aspiration:  -Continue omeprazole  -elevate her head of bed with a wedge pillow on her back  -Avoid for food or drink 3 hours before bedtime  -Avoid reflux trigger foods such as alcohol caffeine and spicy foods     Return to clinic as scheduled next week to follow-up on the above        CCx: Increased shortness of breath past couple of months    HPI: Patient presented to clinic today for urgent follow-up after experiencing shortness of breath with increased cough productive for the yellow sputum mild with the past couple weeks, refractory to prednisone burst and course of doxycycline.  She denies fevers or chills.  She reports feeling congested.  She continues to use a flutter valve twice daily, hypertonic saline nebs twice daily, and albuterol nebs 3 times daily in addition to her other COPD medications    ROS:  A review of 12 organ systems was performed with pertinent positives and negatives noted in the HPI.      Current Meds:  Current Outpatient Medications   Medication Sig   ? acetaminophen (TYLENOL) 500 MG tablet Take 2 tablets (1,000 mg total) by mouth 3 (three) times a day. (Patient taking differently: Take 1,000 mg by mouth as needed.       )   ? albuterol (PROVENTIL) 2.5 mg /3 mL (0.083 %) nebulizer solution 3 ML INHALATION FOUR TIMES A DAY AS NEEDED USE BEFORE NEBULIZED SALINE. FOR SHORTNESS OF BREATH.   ? ARIPiprazole (ABILIFY) 5 MG tablet TAKE 1 TABLET EVERY NIGHT AT  BEDTIME   ? aspirin 81 MG EC tablet Take 81 mg by mouth daily.   ? atorvastatin (LIPITOR) 20 MG tablet TAKE 1 TABLET (20 MG TOTAL) BY MOUTH BEDTIME.   ? azithromycin (ZITHROMAX) 250 MG tablet Take 1 tablet (250 mg total) by mouth daily for 5 days. Take 500 mg (2 x 250 mg tablets) on day 1 followed by 250 mg (1 tablet) on days 2-5.   ? calcium carbonate-vitamin D3 (CALCIUM WITH VITAMIN D) 600 mg(1,500mg) -400 unit per tablet Take 1 tablet by mouth 2 (two) times a day.   ? cholecalciferol, vitamin D3, 1,000 unit tablet Take 2,000 Units by mouth daily.    ? cyanocobalamin 1,000 mcg/mL injection Inject 1 mL (1,000 mcg total) into the shoulder, thigh, or buttocks every 30 (thirty) days.   ? dextromethorphan-guaiFENesin (ROBITUSSIN-DM)  mg/5 mL liquid Take 5 mL by mouth every 4 (four) hours as needed.   ? diltiazem (CARDIZEM CD) 120 MG 24 hr capsule TAKE 1 CAPSULE (120 MG TOTAL) BY MOUTH DAILY.   ? ferrous sulfate 325 (65 FE) MG tablet Take 1 tablet (325 mg total) by mouth daily with breakfast.   ? fluticasone-salmeterol (ADVAIR DISKUS) 250-50 mcg/dose DISKUS Inhale 1 puff 2 (two) times a day.   ? furosemide (LASIX) 40 MG tablet TAKE 1 TABLET (40 MG TOTAL) BY MOUTH 2 (TWO) TIMES A DAY AT 9AM AND 6PM.   ? magnesium oxide (MAG-OX) 400 mg (241.3 mg magnesium) tablet Take 1 tablet (400 mg total) by mouth daily.   ? omeprazole (PRILOSEC) 20 MG capsule Take 20 mg by mouth daily before breakfast.   ? polyethylene glycol (MIRALAX) 17 gram packet Take 17 g by mouth daily.   ? potassium chloride (KLOR-CON) 10 MEQ CR tablet TAKE 1 TABLET (10 MEQ TOTAL) BY MOUTH DAILY.   ? PROAIR HFA 90 mcg/actuation inhaler INHALE 2 PUFFS EVERY 4 (FOUR) HOURS AS NEEDED FOR WHEEZING.   ? QUEtiapine (SEROQUEL) 25 MG tablet TAKE 2 TABLETS AT BEDTIME=50MG (Patient taking differently: TAKE 1 TABLETS AT BEDTIME=50MG)   ? sodium chloride 3 % nebulizer solution TAKE 4 ML BY NEBULIZATION 2 (TWO) TIMES A DAY. USING AFTER ALBUTEROL NEB. (Patient taking  "differently: Take 4 mL by nebulization 3 (three) times a day. Using after albuterol neb.      )   ? spironolactone (ALDACTONE) 25 MG tablet Take 0.5 tablets (12.5 mg total) by mouth daily.   ? tiotropium (SPIRIVA WITH HANDIHALER) 18 mcg inhalation capsule USE 1 CAPSULE VIA INHALER 1 X A DAY   ? XARELTO 20 mg Tab TAKE 1 TABLET (20 MG TOTAL) BY MOUTH DAILY WITH SUPPER.       Labs:  No results found for this or any previous visit (from the past 72 hour(s)).    I have personally reviewed all pertinent imaging studies and PFT results unless otherwise noted.    Imaging studies:  Xr Chest 2 Views    Result Date: 4/16/2019  EXAM: XR CHEST 2 VIEWS LOCATION: [facility] DATE/TIME: 4/16/2019 1:00 PM INDICATION: sob COMPARISON: 01/28/2019 FINDINGS: The lungs are clear. There is no pleural effusion or pneumothorax. The cardiomediastinal silhouette is normal. The limited visualized portions of the upper abdomen are grossly normal. Note is made of a left humeral head bone island.     The lungs are clear.        Physical Exam:  /72   Pulse 87   Ht 5' 1\" (1.549 m)   Wt 145 lb (65.8 kg)   LMP  (LMP Unknown)   SpO2 90%   Breastfeeding? No   BMI 27.40 kg/m     General - Well nourished  Ears/Mouth -  OP pink moist, no thrush  Neck - no cervical lymphadenopathy  Lungs -right greater than left bibasilar rhonchi, no wheezes  CVS - regular rhythm with no murmurs, rubs or gallups  Abdomen - soft, NT, ND, NABS  Ext - no cyanosis, clubbing or edema  Skin - no rash  Psychology - alert and oriented, answers appropriate        Electronically signed by:    Sinan Woo MD  [facility]       " No indicators present

## 2023-06-24 NOTE — ED PROVIDER NOTE - OBJECTIVE STATEMENT
33-year-old woman presenting for evaluation of pain at her  site over the past few days which has been worsening and not responding to medications at home.  She had a  approximately 10 days ago.  She is reporting feeling associated weakness but denying any fevers.  There is no associated nausea or vomiting.

## 2023-06-25 DIAGNOSIS — G89.18 OTHER ACUTE POSTPROCEDURAL PAIN: ICD-10-CM

## 2023-06-25 LAB
ALBUMIN SERPL ELPH-MCNC: 2.9 G/DL — LOW (ref 3.5–5)
ALP SERPL-CCNC: 121 U/L — HIGH (ref 40–120)
ALT FLD-CCNC: 39 U/L DA — SIGNIFICANT CHANGE UP (ref 10–60)
ANION GAP SERPL CALC-SCNC: 8 MMOL/L — SIGNIFICANT CHANGE UP (ref 5–17)
APTT BLD: 34.4 SEC — SIGNIFICANT CHANGE UP (ref 27.5–35.5)
AST SERPL-CCNC: 25 U/L — SIGNIFICANT CHANGE UP (ref 10–40)
BASOPHILS # BLD AUTO: 0.06 K/UL — SIGNIFICANT CHANGE UP (ref 0–0.2)
BASOPHILS NFR BLD AUTO: 0.8 % — SIGNIFICANT CHANGE UP (ref 0–2)
BILIRUB SERPL-MCNC: 0.3 MG/DL — SIGNIFICANT CHANGE UP (ref 0.2–1.2)
BUN SERPL-MCNC: 19 MG/DL — HIGH (ref 7–18)
CALCIUM SERPL-MCNC: 9 MG/DL — SIGNIFICANT CHANGE UP (ref 8.4–10.5)
CHLORIDE SERPL-SCNC: 107 MMOL/L — SIGNIFICANT CHANGE UP (ref 96–108)
CO2 SERPL-SCNC: 24 MMOL/L — SIGNIFICANT CHANGE UP (ref 22–31)
CREAT SERPL-MCNC: 0.55 MG/DL — SIGNIFICANT CHANGE UP (ref 0.5–1.3)
EGFR: 126 ML/MIN/1.73M2 — SIGNIFICANT CHANGE UP
EOSINOPHIL # BLD AUTO: 0.52 K/UL — HIGH (ref 0–0.5)
EOSINOPHIL NFR BLD AUTO: 7.1 % — HIGH (ref 0–6)
GLUCOSE SERPL-MCNC: 104 MG/DL — HIGH (ref 70–99)
HCT VFR BLD CALC: 34.1 % — LOW (ref 34.5–45)
HGB BLD-MCNC: 11.4 G/DL — LOW (ref 11.5–15.5)
IMM GRANULOCYTES NFR BLD AUTO: 0.8 % — SIGNIFICANT CHANGE UP (ref 0–0.9)
INR BLD: 1 RATIO — SIGNIFICANT CHANGE UP (ref 0.88–1.16)
LYMPHOCYTES # BLD AUTO: 2.26 K/UL — SIGNIFICANT CHANGE UP (ref 1–3.3)
LYMPHOCYTES # BLD AUTO: 31 % — SIGNIFICANT CHANGE UP (ref 13–44)
MCHC RBC-ENTMCNC: 30.5 PG — SIGNIFICANT CHANGE UP (ref 27–34)
MCHC RBC-ENTMCNC: 33.4 GM/DL — SIGNIFICANT CHANGE UP (ref 32–36)
MCV RBC AUTO: 91.2 FL — SIGNIFICANT CHANGE UP (ref 80–100)
MONOCYTES # BLD AUTO: 0.53 K/UL — SIGNIFICANT CHANGE UP (ref 0–0.9)
MONOCYTES NFR BLD AUTO: 7.3 % — SIGNIFICANT CHANGE UP (ref 2–14)
NEUTROPHILS # BLD AUTO: 3.86 K/UL — SIGNIFICANT CHANGE UP (ref 1.8–7.4)
NEUTROPHILS NFR BLD AUTO: 53 % — SIGNIFICANT CHANGE UP (ref 43–77)
NRBC # BLD: 0 /100 WBCS — SIGNIFICANT CHANGE UP (ref 0–0)
PLATELET # BLD AUTO: 359 K/UL — SIGNIFICANT CHANGE UP (ref 150–400)
POTASSIUM SERPL-MCNC: 4.1 MMOL/L — SIGNIFICANT CHANGE UP (ref 3.5–5.3)
POTASSIUM SERPL-SCNC: 4.1 MMOL/L — SIGNIFICANT CHANGE UP (ref 3.5–5.3)
PROT SERPL-MCNC: 7.4 G/DL — SIGNIFICANT CHANGE UP (ref 6–8.3)
PROTHROM AB SERPL-ACNC: 11.9 SEC — SIGNIFICANT CHANGE UP (ref 10.5–13.4)
RBC # BLD: 3.74 M/UL — LOW (ref 3.8–5.2)
RBC # FLD: 14.3 % — SIGNIFICANT CHANGE UP (ref 10.3–14.5)
SODIUM SERPL-SCNC: 139 MMOL/L — SIGNIFICANT CHANGE UP (ref 135–145)
WBC # BLD: 7.29 K/UL — SIGNIFICANT CHANGE UP (ref 3.8–10.5)
WBC # FLD AUTO: 7.29 K/UL — SIGNIFICANT CHANGE UP (ref 3.8–10.5)

## 2023-06-25 PROCEDURE — 80053 COMPREHEN METABOLIC PANEL: CPT

## 2023-06-25 PROCEDURE — 74177 CT ABD & PELVIS W/CONTRAST: CPT | Mod: 26,MA

## 2023-06-25 PROCEDURE — 85025 COMPLETE CBC W/AUTO DIFF WBC: CPT

## 2023-06-25 PROCEDURE — 74177 CT ABD & PELVIS W/CONTRAST: CPT | Mod: MA

## 2023-06-25 PROCEDURE — 99284 EMERGENCY DEPT VISIT MOD MDM: CPT | Mod: 25

## 2023-06-25 PROCEDURE — 36415 COLL VENOUS BLD VENIPUNCTURE: CPT

## 2023-06-25 PROCEDURE — 85730 THROMBOPLASTIN TIME PARTIAL: CPT

## 2023-06-25 PROCEDURE — 85610 PROTHROMBIN TIME: CPT

## 2023-06-25 RX ORDER — OXYCODONE HYDROCHLORIDE 5 MG/1
1 TABLET ORAL
Qty: 9 | Refills: 0
Start: 2023-06-25 | End: 2023-06-27

## 2023-06-25 RX ORDER — OXYCODONE HYDROCHLORIDE 5 MG/1
5 TABLET ORAL ONCE
Refills: 0 | Status: DISCONTINUED | OUTPATIENT
Start: 2023-06-25 | End: 2023-06-25

## 2023-06-25 RX ADMIN — OXYCODONE HYDROCHLORIDE 5 MILLIGRAM(S): 5 TABLET ORAL at 04:50

## 2023-06-25 NOTE — CONSULT NOTE ADULT - PROBLEM SELECTOR RECOMMENDATION 9
a/p  32yo  POD #10 s/p rpt c/s on 23; left lower abdominal pain lateral to incision site; patient is stable  - patient instructed to continue alternating motrin/tylenol for pain control; stay hydrated; continue ambulating  - follow up with [address] [place] [address] clinic on  as scheduled for post op appointment      Patient seen and examined with Dr Bell

## 2023-06-25 NOTE — CONSULT NOTE ADULT - ASSESSMENT
a/p  34yo  POD #10 s/p rpt c/s on 23; left lower abdominal pain lateral to incision site; patient is stable  - patient instructed to continue alternating motrin/tylenol for pain control; stay hydrated; continue ambulating  - follow up with [address] [place] [address] clinic on  as scheduled for post op appointment      Patient seen and examined with Dr Bell

## 2023-06-25 NOTE — CONSULT NOTE ADULT - SUBJECTIVE AND OBJECTIVE BOX
HPI: 32yo  POD #10 s/p rpt c/s on 23 at [facility]; presents with left lower abdominal pain lateral to incision site; denies cp, sob, dizziness, palpitations, n/v/d/c, fever; chills.     pobhx   2019 fadia-c/s male 3.5kg, in [facility], denies complications  23 rpt c/s at [facility] uncomplicated  pgynhx: denies fibroids, cyst or stds  pmedhx hypothryoid on levothyroxine 50mcg  psurghx: c/s x2  allergies: nkda  social denies toxic habits; denies h/o anx/depresion    PE:  Vital Signs Last 24 Hrs  T(C): 36.6 (2023 22:49), Max: 36.6 (2023 22:49)  T(F): 97.9 (2023 22:49), Max: 97.9 (2023 22:49)  HR: 79 (2023 22:49) (79 - 79)  BP: 101/70 (2023 22:49) (101/70 - 101/70)  RR: 18 (2023 22:49) (18 - 18)  SpO2: 98% (2023 22:49) (98% - 98%)    Parameters below as of 2023 22:49  Patient On (Oxygen Delivery Method): room air      abd: +bs; soft, nt, nd, no rebound or guarding; incision c/i/d dermabond in place      LABS:                        11.4   7.29  )-----------( 359      ( 2023 00:05 )             34.1         139  |  107  |  19<H>  ----------------------------<  104<H>  4.1   |  24  |  0.55    Ca    9.0      2023 00:05    TPro  7.4  /  Alb  2.9<L>  /  TBili  0.3  /  DBili  x   /  AST  25  /  ALT  39  /  AlkPhos  121<H>  06-25    PT/INR - ( 2023 00:05 )   PT: 11.9 sec;   INR: 1.00 ratio         PTT - ( 2023 00:05 )  PTT:34.4 sec  Urinalysis Basic - ( 2023 00:05 )    Color: x / Appearance: x / SG: x / pH: x  Gluc: 104 mg/dL / Ketone: x  / Bili: x / Urobili: x   Blood: x / Protein: x / Nitrite: x   Leuk Esterase: x / RBC: x / WBC x   Sq Epi: x / Non Sq Epi: x / Bacteria: x        RADIOLOGY & ADDITIONAL STUDIES:  CT abdomen ordered by primary team    a/p  32yo  POD #10 s/p rpt c/s on 23; left lower abdominal pain lateral to incision site; patient is stable  - patient instructed to continue alternating motrin/tylenol for pain control; stay hydrated; continue ambulating  - follow up with [address] [place] [address] on  as scheduled for post op appointment      Patient seen and examined with Dr Bell HPI: 34yo  POD #10 s/p rpt c/s on 23 at [facility]; presents with left lower abdominal pain lateral to incision site; denies cp, sob, dizziness, palpitations, n/v/d/c, fever; chills.     pobhx   2019 fadia-c/s male 3.5kg, in [facility], denies complications  23 rpt c/s at [facility] uncomplicated  pgynhx: denies fibroids, cyst or stds  pmedhx hypothryoid on levothyroxine 50mcg  psurghx: c/s x2  allergies: nkda  social denies toxic habits; denies h/o anx/depresion    PE:  Vital Signs Last 24 Hrs  T(C): 36.6 (2023 22:49), Max: 36.6 (2023 22:49)  T(F): 97.9 (2023 22:49), Max: 97.9 (2023 22:49)  HR: 79 (2023 22:49) (79 - 79)  BP: 101/70 (2023 22:49) (101/70 - 101/70)  RR: 18 (2023 22:49) (18 - 18)  SpO2: 98% (2023 22:49) (98% - 98%)    Parameters below as of 2023 22:49  Patient On (Oxygen Delivery Method): room air      abd: +bs; soft, nt, nd, no rebound or guarding; incision c/i/d dermabond in place      LABS:                        11.4   7.29  )-----------( 359      ( 2023 00:05 )             34.1         139  |  107  |  19<H>  ----------------------------<  104<H>  4.1   |  24  |  0.55    Ca    9.0      2023 00:05    TPro  7.4  /  Alb  2.9<L>  /  TBili  0.3  /  DBili  x   /  AST  25  /  ALT  39  /  AlkPhos  121<H>  06-25    PT/INR - ( 2023 00:05 )   PT: 11.9 sec;   INR: 1.00 ratio         PTT - ( 2023 00:05 )  PTT:34.4 sec  Urinalysis Basic - ( 2023 00:05 )    Color: x / Appearance: x / SG: x / pH: x  Gluc: 104 mg/dL / Ketone: x  / Bili: x / Urobili: x   Blood: x / Protein: x / Nitrite: x   Leuk Esterase: x / RBC: x / WBC x   Sq Epi: x / Non Sq Epi: x / Bacteria: x        RADIOLOGY & ADDITIONAL STUDIES:  CT abdomen ordered by primary team  addendum 23: CT scan resulted   < from: CT Abdomen and Pelvis w/ IV Cont (23 @ 02:34) >  PROCEDURE DATE:  2023          INTERPRETATION:  CLINICAL INFORMATION: Pain and left-sided of    site evaluate for fluid collection    COMPARISON: CT chest 2023.    CONTRAST/COMPLICATIONS:  IV Contrast: Omnipaque 350  90 cc administered   10 cc discarded  Oral Contrast: NONE  Complications: None reported at time of study completion    PROCEDURE:  CT of the Abdomen and Pelvis was performed.  Sagittal and coronal reformats were performed.    FINDINGS:  LOWER CHEST: Within normal limits.    LIVER: Within normal limits.  BILE DUCTS: Normal caliber.  GALLBLADDER: Within normal limits.  SPLEEN: Within normal limits.  PANCREAS: Within normal limits.  ADRENALS: Within normal limits.  KIDNEYS/URETERS: Within normal limits.    BLADDER: Within normal limits.  REPRODUCTIVE ORGANS: Post gravid uterus.    BOWEL: No bowel obstruction. Appendix is normal.  PERITONEUM: No ascites.  VESSELS: Within normallimits.  RETROPERITONEUM/LYMPH NODES: No lymphadenopathy.  ABDOMINAL WALL: Mild induration of the fat at the level of the    scar. No discrete fluid collection.  BONES: Within normal limits.    IMPRESSION:    Mild induration of the fat at the level of the  scar. No   discrete fluid collection.      --- End of Report ---        < end of copied text >      a/p  34yo  POD #10 s/p rpt c/s on 23; left lower abdominal pain lateral to incision site; patient is stable  - patient instructed to continue alternating motrin/tylenol for pain control; stay hydrated; continue ambulating  - follow up with [address] [place] [address] on  as scheduled for post op appointment      Patient seen and examined with Dr Bell

## 2023-06-29 ENCOUNTER — NON-APPOINTMENT (OUTPATIENT)
Age: 33
End: 2023-06-29

## 2023-07-06 ENCOUNTER — OUTPATIENT (OUTPATIENT)
Dept: OUTPATIENT SERVICES | Facility: HOSPITAL | Age: 33
LOS: 1 days | End: 2023-07-06
Payer: MEDICAID

## 2023-07-06 ENCOUNTER — APPOINTMENT (OUTPATIENT)
Dept: OBGYN | Facility: CLINIC | Age: 33
End: 2023-07-06
Payer: MEDICAID

## 2023-07-06 VITALS
HEART RATE: 76 BPM | SYSTOLIC BLOOD PRESSURE: 103 MMHG | HEIGHT: 61 IN | BODY MASS INDEX: 26.06 KG/M2 | WEIGHT: 138 LBS | OXYGEN SATURATION: 98 % | RESPIRATION RATE: 18 BRPM | TEMPERATURE: 97 F | DIASTOLIC BLOOD PRESSURE: 68 MMHG

## 2023-07-06 DIAGNOSIS — Z98.891 HISTORY OF UTERINE SCAR FROM PREVIOUS SURGERY: Chronic | ICD-10-CM

## 2023-07-06 DIAGNOSIS — Z34.00 ENCOUNTER FOR SUPERVISION OF NORMAL FIRST PREGNANCY, UNSPECIFIED TRIMESTER: ICD-10-CM

## 2023-07-06 PROCEDURE — G0463: CPT

## 2023-07-06 PROCEDURE — 99213 OFFICE O/P EST LOW 20 MIN: CPT

## 2023-07-06 NOTE — HISTORY OF PRESENT ILLNESS
[Postpartum Follow Up] : postpartum follow up [Last Pap Date: ___] : Last Pap Date: [unfilled] [Delivery Date: ___] : on [unfilled] [Repeat C/S] : delivered by  section (repeat) [Female] : Delivery History: baby girl [Wt. ___] : weighing [unfilled] [Breastfeeding] : currently nursing [Discharge HCT: ___] : hematocrit level was [unfilled] [Discharge HGB: ___] : hemoglobin level was [unfilled] [Intended Contraception] : Intended Contraception: [Rhogam] : Rhogam was not administered [Rubella Vaccine] : Rubella vaccine was not administered [Pertussis Vaccine] : Pertussis vaccine was not administered [BTL] : no tubal ligation [BF with Difficulty] : nursing without difficulty [Resumed Menses] : has not resumed her menses [Resumed [place]] : has not resumed intercourse [None] : No associated symptoms are reported [Clean/Dry/Intact] : clean, dry and intact [Erythema] : not erythematous [Swelling] : not swollen [Dehiscence] : not dehisced [No [place]] : to avoid sexual intercourse [Limited ADLs] : to participate in activities of daily living with limitations [No Work] : not to work [No Housework] : not to do housework [No Sports] : not to participate in sports [FreeTextEntry8] : incision check  [FreeTextEntry9] : late to care from [place] [de-identified] : repeat elective C?S  [de-identified] : incision check, closed , no erythema and well approximated  [de-identified] : follow up

## 2023-07-07 DIAGNOSIS — Z98.891 HISTORY OF UTERINE SCAR FROM PREVIOUS SURGERY: ICD-10-CM

## 2023-07-20 PROCEDURE — 85730 THROMBOPLASTIN TIME PARTIAL: CPT

## 2023-07-20 PROCEDURE — 81329 SMN1 GENE DOS/DELETION ALYS: CPT

## 2023-07-20 PROCEDURE — 88307 TISSUE EXAM BY PATHOLOGIST: CPT

## 2023-07-20 PROCEDURE — C1889: CPT

## 2023-07-20 PROCEDURE — 85384 FIBRINOGEN ACTIVITY: CPT

## 2023-07-20 PROCEDURE — G0463: CPT

## 2023-07-20 PROCEDURE — 86901 BLOOD TYPING SEROLOGIC RH(D): CPT

## 2023-07-20 PROCEDURE — 59025 FETAL NON-STRESS TEST: CPT

## 2023-07-20 PROCEDURE — 90707 MMR VACCINE SC: CPT

## 2023-07-20 PROCEDURE — 86900 BLOOD TYPING SEROLOGIC ABO: CPT

## 2023-07-20 PROCEDURE — 86923 COMPATIBILITY TEST ELECTRIC: CPT

## 2023-07-20 PROCEDURE — 59050 FETAL MONITOR W/REPORT: CPT

## 2023-07-20 PROCEDURE — 85025 COMPLETE CBC W/AUTO DIFF WBC: CPT

## 2023-07-20 PROCEDURE — 85610 PROTHROMBIN TIME: CPT

## 2023-07-20 PROCEDURE — 86850 RBC ANTIBODY SCREEN: CPT

## 2023-07-20 PROCEDURE — 86780 TREPONEMA PALLIDUM: CPT

## 2023-07-20 PROCEDURE — 36415 COLL VENOUS BLD VENIPUNCTURE: CPT

## 2023-08-04 ENCOUNTER — OUTPATIENT (OUTPATIENT)
Dept: OUTPATIENT SERVICES | Facility: HOSPITAL | Age: 33
LOS: 1 days | End: 2023-08-04
Payer: MEDICAID

## 2023-08-04 ENCOUNTER — APPOINTMENT (OUTPATIENT)
Dept: OBGYN | Facility: CLINIC | Age: 33
End: 2023-08-04
Payer: MEDICAID

## 2023-08-04 VITALS
HEART RATE: 82 BPM | TEMPERATURE: 98 F | OXYGEN SATURATION: 98 % | SYSTOLIC BLOOD PRESSURE: 105 MMHG | HEIGHT: 61 IN | RESPIRATION RATE: 18 BRPM | BODY MASS INDEX: 27 KG/M2 | WEIGHT: 143 LBS | DIASTOLIC BLOOD PRESSURE: 70 MMHG

## 2023-08-04 DIAGNOSIS — O99.013 ANEMIA COMPLICATING PREGNANCY, THIRD TRIMESTER: ICD-10-CM

## 2023-08-04 DIAGNOSIS — Z48.89 ENCOUNTER FOR OTHER SPECIFIED SURGICAL AFTERCARE: ICD-10-CM

## 2023-08-04 DIAGNOSIS — Z98.891 HISTORY OF UTERINE SCAR FROM PREVIOUS SURGERY: ICD-10-CM

## 2023-08-04 DIAGNOSIS — D62 ACUTE POSTHEMORRHAGIC ANEMIA: ICD-10-CM

## 2023-08-04 DIAGNOSIS — E03.9 HYPOTHYROIDISM, UNSPECIFIED: ICD-10-CM

## 2023-08-04 DIAGNOSIS — Z98.891 HISTORY OF UTERINE SCAR FROM PREVIOUS SURGERY: Chronic | ICD-10-CM

## 2023-08-04 DIAGNOSIS — Z34.00 ENCOUNTER FOR SUPERVISION OF NORMAL FIRST PREGNANCY, UNSPECIFIED TRIMESTER: ICD-10-CM

## 2023-08-04 PROCEDURE — 99214 OFFICE O/P EST MOD 30 MIN: CPT | Mod: TH

## 2023-08-04 PROCEDURE — G0463: CPT

## 2023-08-04 NOTE — HISTORY OF PRESENT ILLNESS
[Last Pap Date: ___] : Last Pap Date: [unfilled] [Delivery Date: ___] : on [unfilled] [Repeat C/S] : delivered by  section (repeat) [Female] : Delivery History: baby girl [Wt. ___] : weighing [unfilled] [Breastfeeding] : currently nursing [Discharge HCT: ___] : hematocrit level was [unfilled] [Discharge HGB: ___] : hemoglobin level was [unfilled] [Resumed [place]] : has resumed intercourse [Complications:___] : no complications [Rhogam] : Rhogam was not administered [Rubella Vaccine] : Rubella vaccine was not administered [Pertussis Vaccine] : Pertussis vaccine was not administered [BTL] : no tubal ligation [BF with Difficulty] : nursing without difficulty [Resumed Menses] : has not resumed her menses [Intended Contraception] : the patient does not intended to use contraception postpartum [Clean/Dry/Intact] : clean, dry and intact [Erythema] : not erythematous [Swelling] : not swollen [Dehiscence] : not dehisced [Healed] : healed [Back to Normal] : is back to normal in size [Examination Of The Breasts] : breasts are normal [Doing Well] : is doing well [No Sign of Infection] : is showing no signs of infection [Excellent Pain Control] : has excellent pain control [None] : None [FreeTextEntry8] : Breastfeeding / EPDS~13 - consulted w/SW - left first child in [facility], causing depression due to child separation.  [de-identified] : Postpartum exam [de-identified] : RTO 6 months for annual gyn exam

## 2023-08-08 DIAGNOSIS — Z98.891 HISTORY OF UTERINE SCAR FROM PREVIOUS SURGERY: ICD-10-CM

## 2023-08-08 DIAGNOSIS — Z48.89 ENCOUNTER FOR OTHER SPECIFIED SURGICAL AFTERCARE: ICD-10-CM

## 2023-08-08 DIAGNOSIS — E03.9 HYPOTHYROIDISM, UNSPECIFIED: ICD-10-CM

## 2023-12-29 ENCOUNTER — EMERGENCY (EMERGENCY)
Facility: HOSPITAL | Age: 33
LOS: 1 days | Discharge: HOME | End: 2023-12-29
Attending: EMERGENCY MEDICINE
Payer: MEDICAID

## 2023-12-29 VITALS
HEIGHT: 61 IN | SYSTOLIC BLOOD PRESSURE: 115 MMHG | WEIGHT: 158.07 LBS | RESPIRATION RATE: 18 BRPM | TEMPERATURE: 98 F | OXYGEN SATURATION: 96 % | DIASTOLIC BLOOD PRESSURE: 83 MMHG | HEART RATE: 86 BPM

## 2023-12-29 DIAGNOSIS — Z98.891 HISTORY OF UTERINE SCAR FROM PREVIOUS SURGERY: Chronic | ICD-10-CM

## 2023-12-29 LAB
ALBUMIN SERPL ELPH-MCNC: 3.9 G/DL — SIGNIFICANT CHANGE UP (ref 3.5–5)
ALBUMIN SERPL ELPH-MCNC: 3.9 G/DL — SIGNIFICANT CHANGE UP (ref 3.5–5)
ALP SERPL-CCNC: 79 U/L — SIGNIFICANT CHANGE UP (ref 40–120)
ALP SERPL-CCNC: 79 U/L — SIGNIFICANT CHANGE UP (ref 40–120)
ALT FLD-CCNC: 48 U/L DA — SIGNIFICANT CHANGE UP (ref 10–60)
ALT FLD-CCNC: 48 U/L DA — SIGNIFICANT CHANGE UP (ref 10–60)
ANION GAP SERPL CALC-SCNC: 5 MMOL/L — SIGNIFICANT CHANGE UP (ref 5–17)
ANION GAP SERPL CALC-SCNC: 5 MMOL/L — SIGNIFICANT CHANGE UP (ref 5–17)
APPEARANCE UR: CLEAR — SIGNIFICANT CHANGE UP
APPEARANCE UR: CLEAR — SIGNIFICANT CHANGE UP
AST SERPL-CCNC: 22 U/L — SIGNIFICANT CHANGE UP (ref 10–40)
AST SERPL-CCNC: 22 U/L — SIGNIFICANT CHANGE UP (ref 10–40)
BACTERIA # UR AUTO: ABNORMAL /HPF
BACTERIA # UR AUTO: ABNORMAL /HPF
BASOPHILS # BLD AUTO: 0.07 K/UL — SIGNIFICANT CHANGE UP (ref 0–0.2)
BASOPHILS # BLD AUTO: 0.07 K/UL — SIGNIFICANT CHANGE UP (ref 0–0.2)
BASOPHILS NFR BLD AUTO: 0.8 % — SIGNIFICANT CHANGE UP (ref 0–2)
BASOPHILS NFR BLD AUTO: 0.8 % — SIGNIFICANT CHANGE UP (ref 0–2)
BILIRUB SERPL-MCNC: 0.2 MG/DL — SIGNIFICANT CHANGE UP (ref 0.2–1.2)
BILIRUB SERPL-MCNC: 0.2 MG/DL — SIGNIFICANT CHANGE UP (ref 0.2–1.2)
BILIRUB UR-MCNC: NEGATIVE — SIGNIFICANT CHANGE UP
BILIRUB UR-MCNC: NEGATIVE — SIGNIFICANT CHANGE UP
BUN SERPL-MCNC: 11 MG/DL — SIGNIFICANT CHANGE UP (ref 7–18)
BUN SERPL-MCNC: 11 MG/DL — SIGNIFICANT CHANGE UP (ref 7–18)
CALCIUM SERPL-MCNC: 9.1 MG/DL — SIGNIFICANT CHANGE UP (ref 8.4–10.5)
CALCIUM SERPL-MCNC: 9.1 MG/DL — SIGNIFICANT CHANGE UP (ref 8.4–10.5)
CHLORIDE SERPL-SCNC: 108 MMOL/L — SIGNIFICANT CHANGE UP (ref 96–108)
CHLORIDE SERPL-SCNC: 108 MMOL/L — SIGNIFICANT CHANGE UP (ref 96–108)
CO2 SERPL-SCNC: 25 MMOL/L — SIGNIFICANT CHANGE UP (ref 22–31)
CO2 SERPL-SCNC: 25 MMOL/L — SIGNIFICANT CHANGE UP (ref 22–31)
COLOR SPEC: YELLOW — SIGNIFICANT CHANGE UP
COLOR SPEC: YELLOW — SIGNIFICANT CHANGE UP
CREAT SERPL-MCNC: 0.63 MG/DL — SIGNIFICANT CHANGE UP (ref 0.5–1.3)
CREAT SERPL-MCNC: 0.63 MG/DL — SIGNIFICANT CHANGE UP (ref 0.5–1.3)
DIFF PNL FLD: ABNORMAL
DIFF PNL FLD: ABNORMAL
EGFR: 122 ML/MIN/1.73M2 — SIGNIFICANT CHANGE UP
EGFR: 122 ML/MIN/1.73M2 — SIGNIFICANT CHANGE UP
EOSINOPHIL # BLD AUTO: 0.54 K/UL — HIGH (ref 0–0.5)
EOSINOPHIL # BLD AUTO: 0.54 K/UL — HIGH (ref 0–0.5)
EOSINOPHIL NFR BLD AUTO: 6.4 % — HIGH (ref 0–6)
EOSINOPHIL NFR BLD AUTO: 6.4 % — HIGH (ref 0–6)
EPI CELLS # UR: PRESENT
EPI CELLS # UR: PRESENT
GLUCOSE SERPL-MCNC: 107 MG/DL — HIGH (ref 70–99)
GLUCOSE SERPL-MCNC: 107 MG/DL — HIGH (ref 70–99)
GLUCOSE UR QL: NEGATIVE MG/DL — SIGNIFICANT CHANGE UP
GLUCOSE UR QL: NEGATIVE MG/DL — SIGNIFICANT CHANGE UP
HCG UR QL: NEGATIVE — SIGNIFICANT CHANGE UP
HCG UR QL: NEGATIVE — SIGNIFICANT CHANGE UP
HCT VFR BLD CALC: 34.2 % — LOW (ref 34.5–45)
HCT VFR BLD CALC: 34.2 % — LOW (ref 34.5–45)
HGB BLD-MCNC: 11.9 G/DL — SIGNIFICANT CHANGE UP (ref 11.5–15.5)
HGB BLD-MCNC: 11.9 G/DL — SIGNIFICANT CHANGE UP (ref 11.5–15.5)
IMM GRANULOCYTES NFR BLD AUTO: 0.2 % — SIGNIFICANT CHANGE UP (ref 0–0.9)
IMM GRANULOCYTES NFR BLD AUTO: 0.2 % — SIGNIFICANT CHANGE UP (ref 0–0.9)
KETONES UR-MCNC: NEGATIVE MG/DL — SIGNIFICANT CHANGE UP
KETONES UR-MCNC: NEGATIVE MG/DL — SIGNIFICANT CHANGE UP
LEUKOCYTE ESTERASE UR-ACNC: ABNORMAL
LEUKOCYTE ESTERASE UR-ACNC: ABNORMAL
LYMPHOCYTES # BLD AUTO: 2.59 K/UL — SIGNIFICANT CHANGE UP (ref 1–3.3)
LYMPHOCYTES # BLD AUTO: 2.59 K/UL — SIGNIFICANT CHANGE UP (ref 1–3.3)
LYMPHOCYTES # BLD AUTO: 30.8 % — SIGNIFICANT CHANGE UP (ref 13–44)
LYMPHOCYTES # BLD AUTO: 30.8 % — SIGNIFICANT CHANGE UP (ref 13–44)
MCHC RBC-ENTMCNC: 31.2 PG — SIGNIFICANT CHANGE UP (ref 27–34)
MCHC RBC-ENTMCNC: 31.2 PG — SIGNIFICANT CHANGE UP (ref 27–34)
MCHC RBC-ENTMCNC: 34.8 GM/DL — SIGNIFICANT CHANGE UP (ref 32–36)
MCHC RBC-ENTMCNC: 34.8 GM/DL — SIGNIFICANT CHANGE UP (ref 32–36)
MCV RBC AUTO: 89.5 FL — SIGNIFICANT CHANGE UP (ref 80–100)
MCV RBC AUTO: 89.5 FL — SIGNIFICANT CHANGE UP (ref 80–100)
MONOCYTES # BLD AUTO: 0.5 K/UL — SIGNIFICANT CHANGE UP (ref 0–0.9)
MONOCYTES # BLD AUTO: 0.5 K/UL — SIGNIFICANT CHANGE UP (ref 0–0.9)
MONOCYTES NFR BLD AUTO: 5.9 % — SIGNIFICANT CHANGE UP (ref 2–14)
MONOCYTES NFR BLD AUTO: 5.9 % — SIGNIFICANT CHANGE UP (ref 2–14)
NEUTROPHILS # BLD AUTO: 4.69 K/UL — SIGNIFICANT CHANGE UP (ref 1.8–7.4)
NEUTROPHILS # BLD AUTO: 4.69 K/UL — SIGNIFICANT CHANGE UP (ref 1.8–7.4)
NEUTROPHILS NFR BLD AUTO: 55.9 % — SIGNIFICANT CHANGE UP (ref 43–77)
NEUTROPHILS NFR BLD AUTO: 55.9 % — SIGNIFICANT CHANGE UP (ref 43–77)
NITRITE UR-MCNC: NEGATIVE — SIGNIFICANT CHANGE UP
NITRITE UR-MCNC: NEGATIVE — SIGNIFICANT CHANGE UP
NRBC # BLD: 0 /100 WBCS — SIGNIFICANT CHANGE UP (ref 0–0)
NRBC # BLD: 0 /100 WBCS — SIGNIFICANT CHANGE UP (ref 0–0)
PH UR: 6 — SIGNIFICANT CHANGE UP (ref 5–8)
PH UR: 6 — SIGNIFICANT CHANGE UP (ref 5–8)
PLATELET # BLD AUTO: 341 K/UL — SIGNIFICANT CHANGE UP (ref 150–400)
PLATELET # BLD AUTO: 341 K/UL — SIGNIFICANT CHANGE UP (ref 150–400)
POTASSIUM SERPL-MCNC: 3.7 MMOL/L — SIGNIFICANT CHANGE UP (ref 3.5–5.3)
POTASSIUM SERPL-MCNC: 3.7 MMOL/L — SIGNIFICANT CHANGE UP (ref 3.5–5.3)
POTASSIUM SERPL-SCNC: 3.7 MMOL/L — SIGNIFICANT CHANGE UP (ref 3.5–5.3)
POTASSIUM SERPL-SCNC: 3.7 MMOL/L — SIGNIFICANT CHANGE UP (ref 3.5–5.3)
PROT SERPL-MCNC: 8.1 G/DL — SIGNIFICANT CHANGE UP (ref 6–8.3)
PROT SERPL-MCNC: 8.1 G/DL — SIGNIFICANT CHANGE UP (ref 6–8.3)
PROT UR-MCNC: NEGATIVE MG/DL — SIGNIFICANT CHANGE UP
PROT UR-MCNC: NEGATIVE MG/DL — SIGNIFICANT CHANGE UP
RBC # BLD: 3.82 M/UL — SIGNIFICANT CHANGE UP (ref 3.8–5.2)
RBC # BLD: 3.82 M/UL — SIGNIFICANT CHANGE UP (ref 3.8–5.2)
RBC # FLD: 12.9 % — SIGNIFICANT CHANGE UP (ref 10.3–14.5)
RBC # FLD: 12.9 % — SIGNIFICANT CHANGE UP (ref 10.3–14.5)
RBC CASTS # UR COMP ASSIST: 5 /HPF — HIGH (ref 0–4)
RBC CASTS # UR COMP ASSIST: 5 /HPF — HIGH (ref 0–4)
SODIUM SERPL-SCNC: 138 MMOL/L — SIGNIFICANT CHANGE UP (ref 135–145)
SODIUM SERPL-SCNC: 138 MMOL/L — SIGNIFICANT CHANGE UP (ref 135–145)
SP GR SPEC: 1.01 — SIGNIFICANT CHANGE UP (ref 1–1.03)
SP GR SPEC: 1.01 — SIGNIFICANT CHANGE UP (ref 1–1.03)
T3 SERPL-MCNC: 70 NG/DL — LOW (ref 80–200)
T3 SERPL-MCNC: 70 NG/DL — LOW (ref 80–200)
T4 AB SER-ACNC: 2.4 UG/DL — LOW (ref 4.6–12)
T4 AB SER-ACNC: 2.4 UG/DL — LOW (ref 4.6–12)
T4 FREE SERPL-MCNC: 0.4 NG/DL — LOW (ref 0.9–1.8)
T4 FREE SERPL-MCNC: 0.4 NG/DL — LOW (ref 0.9–1.8)
TSH SERPL-MCNC: 125 UU/ML — HIGH (ref 0.34–4.82)
TSH SERPL-MCNC: 125 UU/ML — HIGH (ref 0.34–4.82)
UROBILINOGEN FLD QL: 0.2 MG/DL — SIGNIFICANT CHANGE UP (ref 0.2–1)
UROBILINOGEN FLD QL: 0.2 MG/DL — SIGNIFICANT CHANGE UP (ref 0.2–1)
WBC # BLD: 8.41 K/UL — SIGNIFICANT CHANGE UP (ref 3.8–10.5)
WBC # BLD: 8.41 K/UL — SIGNIFICANT CHANGE UP (ref 3.8–10.5)
WBC # FLD AUTO: 8.41 K/UL — SIGNIFICANT CHANGE UP (ref 3.8–10.5)
WBC # FLD AUTO: 8.41 K/UL — SIGNIFICANT CHANGE UP (ref 3.8–10.5)
WBC UR QL: 5 /HPF — SIGNIFICANT CHANGE UP (ref 0–5)
WBC UR QL: 5 /HPF — SIGNIFICANT CHANGE UP (ref 0–5)

## 2023-12-29 PROCEDURE — 99284 EMERGENCY DEPT VISIT MOD MDM: CPT | Mod: 25

## 2023-12-29 PROCEDURE — 84439 ASSAY OF FREE THYROXINE: CPT

## 2023-12-29 PROCEDURE — 96374 THER/PROPH/DIAG INJ IV PUSH: CPT

## 2023-12-29 PROCEDURE — 80053 COMPREHEN METABOLIC PANEL: CPT

## 2023-12-29 PROCEDURE — 81001 URINALYSIS AUTO W/SCOPE: CPT

## 2023-12-29 PROCEDURE — 36415 COLL VENOUS BLD VENIPUNCTURE: CPT

## 2023-12-29 PROCEDURE — 99284 EMERGENCY DEPT VISIT MOD MDM: CPT

## 2023-12-29 PROCEDURE — 84436 ASSAY OF TOTAL THYROXINE: CPT

## 2023-12-29 PROCEDURE — 85025 COMPLETE CBC W/AUTO DIFF WBC: CPT

## 2023-12-29 PROCEDURE — 81025 URINE PREGNANCY TEST: CPT

## 2023-12-29 PROCEDURE — 84480 ASSAY TRIIODOTHYRONINE (T3): CPT

## 2023-12-29 PROCEDURE — 84443 ASSAY THYROID STIM HORMONE: CPT

## 2023-12-29 RX ORDER — SODIUM CHLORIDE 9 MG/ML
3 INJECTION INTRAMUSCULAR; INTRAVENOUS; SUBCUTANEOUS ONCE
Refills: 0 | Status: COMPLETED | OUTPATIENT
Start: 2023-12-29 | End: 2023-12-29

## 2023-12-29 RX ORDER — LEVOTHYROXINE SODIUM 125 MCG
1 TABLET ORAL
Qty: 14 | Refills: 0
Start: 2023-12-29 | End: 2024-01-11

## 2023-12-29 RX ORDER — KETOROLAC TROMETHAMINE 30 MG/ML
30 SYRINGE (ML) INJECTION ONCE
Refills: 0 | Status: DISCONTINUED | OUTPATIENT
Start: 2023-12-29 | End: 2023-12-29

## 2023-12-29 RX ADMIN — Medication 30 MILLIGRAM(S): at 18:55

## 2023-12-29 RX ADMIN — SODIUM CHLORIDE 3 MILLILITER(S): 9 INJECTION INTRAMUSCULAR; INTRAVENOUS; SUBCUTANEOUS at 18:31

## 2023-12-29 RX ADMIN — Medication 30 MILLIGRAM(S): at 20:00

## 2023-12-29 NOTE — ED PROVIDER NOTE - OBJECTIVE STATEMENT
Patient is a 32 y/o female with a PMHx of hypothyroidism and unknown medication s/p  6 months ago c/o vague abdominal pain, chills, cold intolerance, malaise. Denies vomiting, diarrhea, and all other acute complaints. Patient is a 34 y/o female with a PMHx of hypothyroidism and unknown medication s/p  6 months ago c/o vague abdominal pain, chills, cold intolerance, malaise. Denies vomiting, diarrhea, and all other acute complaints.

## 2023-12-29 NOTE — ED PROVIDER NOTE - NSFOLLOWUPINSTRUCTIONS_ED_ALL_ED_FT
Follow up with endocrinologist        English    Hypothyroidism  An adult, showing the location of the thyroid gland.   Hypothyroidism is when the thyroid gland does not make enough of certain hormones. This is called an underactive thyroid. The thyroid gland is a small gland located in the lower front part of the neck, just in front of the windpipe (trachea). This gland makes hormones that help control how the body uses food for energy (metabolism) as well as how the heart and brain function. These hormones also play a role in keeping your bones strong. When the thyroid is underactive, it produces too little of the hormones thyroxine (T4) and triiodothyronine (T3).    What are the causes?  This condition may be caused by:  Hashimoto's disease. This is a disease in which the body's disease-fighting system (immune system) attacks the thyroid gland. This is the most common cause.  Viral infections.  Pregnancy.  Certain medicines.  Birth defects.  Problems with a gland in the center of the brain (pituitary gland).  Lack of enough iodine in the diet.  Other causes may include:  Past radiation treatments to the head or neck for cancer.  Past treatment with radioactive iodine.  Past exposure to radiation in the environment.  Past surgical removal of part or all of the thyroid.  What increases the risk?  You are more likely to develop this condition if:  You are female.  You have a family history of thyroid conditions.  You use a medicine called lithium.  You take medicines that affect the immune system (immunosuppressants).  What are the signs or symptoms?  Common symptoms of this condition include:  Not being able to tolerate cold.  Feeling as though you have no energy (lethargy).  Lack of appetite.  Constipation.  Sadness or depression.  Weight gain that is not explained by a change in diet or exercise habits.  Menstrual irregularity.  Dry skin, coarse hair, or brittle nails.  Other symptoms may include:  Muscle pain.  Slowing of thought processes.  Poor memory.  How is this diagnosed?  This condition may be diagnosed based on:  Your symptoms, your medical history, and a physical exam.  Blood tests.  You may also have imaging tests, such as an ultrasound or MRI.    How is this treated?  This condition is treated with medicine that replaces the thyroid hormones that your body does not make. After you begin treatment, it may take several weeks for symptoms to go away.    Follow these instructions at home:  Take over-the-counter and prescription medicines only as told by your health care provider.  If you start taking any new medicines, tell your health care provider.  Keep all follow-up visits as told by your health care provider. This is important.  As your condition improves, your dosage of thyroid hormone medicine may change.  You will need to have blood tests regularly so that your health care provider can monitor your condition.  Contact a health care provider if:  Your symptoms do not get better with treatment.  You are taking thyroid hormone replacement medicine and you:  Sweat a lot.  Have tremors.  Feel anxious.  Lose weight rapidly.  Cannot tolerate heat.  Have emotional swings.  Have diarrhea.  Feel weak.  Get help right away if:  You have chest pain.  You have an irregular heartbeat.  You have a rapid heartbeat.  You have difficulty breathing.  These symptoms may be an emergency. Get help right away. Call 911.  Do not wait to see if the symptoms will go away.  Do not drive yourself to the hospital.  Summary  Hypothyroidism is when the thyroid gland does not make enough of certain hormones (it is underactive).  When the thyroid is underactive, it produces too little of the hormones thyroxine (T4) and triiodothyronine (T3).  The most common cause is Hashimoto's disease, a disease in which the body's disease-fighting system (immune system) attacks the thyroid gland. The condition can also be caused by viral infections, medicine, pregnancy, or past radiation treatment to the head or neck.  Symptoms may include weight gain, dry skin, constipation, feeling as though you do not have energy, and not being able to tolerate cold.  This condition is treated with medicine to replace the thyroid hormones that your body does not make.  This information is not intended to replace advice given to you by your health care provider. Make sure you discuss any questions you have with your health care provider.    Document Revised: 12/20/2022 Document Reviewed: 12/20/2022  Elsevier Patient Education © 2023 [employer]  [employer] logo  Terms and Conditions  Privacy Policy  Editorial Policy  All content on this site: Copyright © 2023 Elsevier, its licensors, and contributors. All rights are reserved, including those for text and data mining, AI training, and similar technologies. For all open access content, the Creative Commons licensing terms apply.  Cookies are used by this site. To decline or learn more, visit our Cookies page.  [employer] Follow up with endocrinologist        English    Hypothyroidism  An adult, showing the location of the thyroid gland.   Hypothyroidism is when the thyroid gland does not make enough of certain hormones. This is called an underactive thyroid. The thyroid gland is a small gland located in the lower front part of the neck, just in front of the windpipe (trachea). This gland makes hormones that help control how the body uses food for energy (metabolism) as well as how the heart and brain function. These hormones also play a role in keeping your bones strong. When the thyroid is underactive, it produces too little of the hormones thyroxine (T4) and triiodothyronine (T3).    What are the causes?  This condition may be caused by:  Hashimoto's disease. This is a disease in which the body's disease-fighting system (immune system) attacks the thyroid gland. This is the most common cause.  Viral infections.  Pregnancy.  Certain medicines.  Birth defects.  Problems with a gland in the center of the brain (pituitary gland).  Lack of enough iodine in the diet.  Other causes may include:  Past radiation treatments to the head or neck for cancer.  Past treatment with radioactive iodine.  Past exposure to radiation in the environment.  Past surgical removal of part or all of the thyroid.  What increases the risk?  You are more likely to develop this condition if:  You are female.  You have a family history of thyroid conditions.  You use a medicine called lithium.  You take medicines that affect the immune system (immunosuppressants).  What are the signs or symptoms?  Common symptoms of this condition include:  Not being able to tolerate cold.  Feeling as though you have no energy (lethargy).  Lack of appetite.  Constipation.  Sadness or depression.  Weight gain that is not explained by a change in diet or exercise habits.  Menstrual irregularity.  Dry skin, coarse hair, or brittle nails.  Other symptoms may include:  Muscle pain.  Slowing of thought processes.  Poor memory.  How is this diagnosed?  This condition may be diagnosed based on:  Your symptoms, your medical history, and a physical exam.  Blood tests.  You may also have imaging tests, such as an ultrasound or MRI.    How is this treated?  This condition is treated with medicine that replaces the thyroid hormones that your body does not make. After you begin treatment, it may take several weeks for symptoms to go away.    Follow these instructions at home:  Take over-the-counter and prescription medicines only as told by your health care provider.  If you start taking any new medicines, tell your health care provider.  Keep all follow-up visits as told by your health care provider. This is important.  As your condition improves, your dosage of thyroid hormone medicine may change.  You will need to have blood tests regularly so that your health care provider can monitor your condition.  Contact a health care provider if:  Your symptoms do not get better with treatment.  You are taking thyroid hormone replacement medicine and you:  Sweat a lot.  Have tremors.  Feel anxious.  Lose weight rapidly.  Cannot tolerate heat.  Have emotional swings.  Have diarrhea.  Feel weak.  Get help right away if:  You have chest pain.  You have an irregular heartbeat.  You have a rapid heartbeat.  You have difficulty breathing.  These symptoms may be an emergency. Get help right away. Call 911.  Do not wait to see if the symptoms will go away.  Do not drive yourself to the hospital.  Summary  Hypothyroidism is when the thyroid gland does not make enough of certain hormones (it is underactive).  When the thyroid is underactive, it produces too little of the hormones thyroxine (T4) and triiodothyronine (T3).  The most common cause is Hashimoto's disease, a disease in which the body's disease-fighting system (immune system) attacks the thyroid gland. The condition can also be caused by viral infections, medicine, pregnancy, or past radiation treatment to the head or neck.  Symptoms may include weight gain, dry skin, constipation, feeling as though you do not have energy, and not being able to tolerate cold.  This condition is treated with medicine to replace the thyroid hormones that your body does not make.  This information is not intended to replace advice given to you by your health care provider. Make sure you discuss any questions you have with your health care provider.    Document Revised: 12/20/2022 Document Reviewed: 12/20/2022  Elsevier Patient Education © 2023 [employer]  [employer] logo  Terms and Conditions  Privacy Policy  Editorial Policy  All content on this site: Copyright © 2023 Elsevier, its licensors, and contributors. All rights are reserved, including those for text and data mining, AI training, and similar technologies. For all open access content, the Creative Commons licensing terms apply.  Cookies are used by this site. To decline or learn more, visit our Cookies page.  [employer]

## 2023-12-29 NOTE — ED PROVIDER NOTE - PATIENT PORTAL LINK FT
You can access the FollowMyHealth Patient Portal offered by [facility] by registering at the following website: [email or web address]. By joining [employer]’s FollowMyHealth portal, you will also be able to view your health information using other applications (apps) compatible with our system. You can access the FollowMyHealth Patient Portal offered by [facility] by registering at the following website: [email or web address]. By joining [employer]’s FollowMyHealth portal, you will also be able to view your health information using other applications (apps) compatible with our system.

## 2023-12-29 NOTE — ED PROVIDER NOTE - CLINICAL SUMMARY MEDICAL DECISION MAKING FREE TEXT BOX
will obtain bloods cbc to r/o anemia or infection  chemistries to check for diabetes  and thyroid functions for her cold intolerance

## 2023-12-29 NOTE — ED ADULT NURSE NOTE - NSFALLUNIVINTERV_ED_ALL_ED
Bed/Stretcher in lowest position, wheels locked, appropriate side rails in place/Call bell, personal items and telephone in reach/Instruct patient to call for assistance before getting out of bed/chair/stretcher/Non-slip footwear applied when patient is off stretcher/[place] to call system/Physically safe environment - no spills, clutter or unnecessary equipment/Purposeful proactive rounding/Room/bathroom lighting operational, light cord in reach Bed/Stretcher in lowest position, wheels locked, appropriate side rails in place/Call bell, personal items and telephone in reach/Instruct patient to call for assistance before getting out of bed/chair/stretcher/Non-slip footwear applied when patient is off stretcher/[place] to call system/Physically safe environment - no spills, clutter or unnecessary equipment/Purposeful proactive rounding/Room/bathroom lighting operational, light cord in reach

## 2023-12-29 NOTE — ED PROVIDER NOTE - PROGRESS NOTE DETAILS
Thyroid tests show Hypothyroidism. Will start levothyroxine 100 micrograms. Refer to endocrinologist.

## 2024-09-20 NOTE — DISCHARGE NOTE OB - REDNESS, SWELLING, YELLOW-GREEN OR BLOODY DISCHARGE FROM YOUR INCISION
Spoke with patient that Hydrochlorothiazide is not an allergy. Epic was giving me a hard stop.  
Statement Selected

## 2025-04-10 NOTE — PATIENT PROFILE OB - AS DELIV COMPLICATIONS OB
Last refill: 01/07/25  qtY: 84  W/ 0 refills  Last ov: 10/10/24    Requested Prescriptions     Pending Prescriptions Disp Refills    SPRINTEC 28 0.25-35 MG-MCG Oral Tab [Pharmacy Med Name: Sprintec 28 0.25-35 MG-MCG Oral Tablet] 84 tablet 0     Sig: Take 1 tablet by mouth once daily     No future appointments.     none